# Patient Record
Sex: FEMALE | Race: WHITE | NOT HISPANIC OR LATINO | Employment: OTHER | URBAN - METROPOLITAN AREA
[De-identification: names, ages, dates, MRNs, and addresses within clinical notes are randomized per-mention and may not be internally consistent; named-entity substitution may affect disease eponyms.]

---

## 2017-02-21 ENCOUNTER — OFFICE VISIT (OUTPATIENT)
Dept: OBSTETRICS AND GYNECOLOGY | Facility: CLINIC | Age: 57
End: 2017-02-21

## 2017-02-21 ENCOUNTER — APPOINTMENT (OUTPATIENT)
Dept: WOMENS IMAGING | Facility: HOSPITAL | Age: 57
End: 2017-02-21

## 2017-02-21 ENCOUNTER — PROCEDURE VISIT (OUTPATIENT)
Dept: OBSTETRICS AND GYNECOLOGY | Facility: CLINIC | Age: 57
End: 2017-02-21

## 2017-02-21 VITALS — HEIGHT: 66 IN | WEIGHT: 186.4 LBS | BODY MASS INDEX: 29.96 KG/M2

## 2017-02-21 DIAGNOSIS — Z01.419 WELL WOMAN EXAM WITH ROUTINE GYNECOLOGICAL EXAM: ICD-10-CM

## 2017-02-21 DIAGNOSIS — Z12.31 VISIT FOR SCREENING MAMMOGRAM: ICD-10-CM

## 2017-02-21 DIAGNOSIS — Z12.31 VISIT FOR SCREENING MAMMOGRAM: Primary | ICD-10-CM

## 2017-02-21 DIAGNOSIS — Z78.0 MENOPAUSE: ICD-10-CM

## 2017-02-21 DIAGNOSIS — K64.9 HEMORRHOIDS, UNSPECIFIED HEMORRHOID TYPE: ICD-10-CM

## 2017-02-21 DIAGNOSIS — Z12.12 SCREENING FOR RECTAL CANCER: Primary | ICD-10-CM

## 2017-02-21 PROBLEM — Z80.3 FAMILY HISTORY OF BREAST CANCER IN MOTHER: Status: ACTIVE | Noted: 2017-02-21

## 2017-02-21 LAB
DEVELOPER EXPIRATION DATE: ABNORMAL
DEVELOPER LOT NUMBER: ABNORMAL
EXPIRATION DATE: ABNORMAL
FECAL OCCULT BLOOD SCREEN, POC: POSITIVE
Lab: ABNORMAL
NEGATIVE CONTROL: POSITIVE
POSITIVE CONTROL: POSITIVE

## 2017-02-21 PROCEDURE — G0328 FECAL BLOOD SCRN IMMUNOASSAY: HCPCS | Performed by: OBSTETRICS & GYNECOLOGY

## 2017-02-21 PROCEDURE — 77067 SCR MAMMO BI INCL CAD: CPT | Performed by: RADIOLOGY

## 2017-02-21 PROCEDURE — 99396 PREV VISIT EST AGE 40-64: CPT | Performed by: OBSTETRICS & GYNECOLOGY

## 2017-02-21 PROCEDURE — 77067 SCR MAMMO BI INCL CAD: CPT | Performed by: OBSTETRICS & GYNECOLOGY

## 2017-02-21 RX ORDER — MULTIPLE VITAMINS W/ MINERALS TAB 9MG-400MCG
1 TAB ORAL DAILY
COMMUNITY

## 2017-02-21 RX ORDER — CEPHRADINE 500 MG
CAPSULE ORAL
COMMUNITY

## 2017-02-21 NOTE — PROGRESS NOTES
Subjective   Urszula Higgins is a 56 y.o. female     CC: Annual    Last annual Elizabeth 10, 2015  Last pap Elizabeth 10, 2015  Last mammogram February 21, 2017  Last colonoscopy approx 2-3 years ago, no polyps.       History of Present Illness: reports no gyn concerns or complaints.  Up to date on colonoscopy: had mammogram today.    The following portions of the patient's history were reviewed and updated as appropriate: allergies, current medications, past family history, past medical history, past social history, past surgical history and problem list.    Review of Systems   Constitutional: Negative for fatigue and fever.   HENT: Negative for congestion.    Eyes: Negative for visual disturbance.   Respiratory: Negative for chest tightness and shortness of breath.    Cardiovascular: Negative for chest pain, palpitations and leg swelling.   Gastrointestinal: Negative for abdominal pain and blood in stool.   Endocrine: Negative for cold intolerance and heat intolerance.   Genitourinary: Negative for difficulty urinating, dysuria, frequency, genital sores, hematuria, menstrual problem, pelvic pain, urgency, vaginal bleeding, vaginal discharge and vaginal pain.   Musculoskeletal: Negative for back pain.   Skin: Negative for color change and rash.   Neurological: Negative for syncope and headaches.   Psychiatric/Behavioral: Negative for sleep disturbance.       History reviewed. No pertinent past medical history.  Past Surgical History   Procedure Laterality Date   • Tubal abdominal ligation     • Pilonidal cyst / sinus excision     • External ear surgery       ripped earlobe     OB History     No data available        Menstrual History:  OB History     No data available         Menarche age: 14  No LMP recorded. Patient is postmenopausal.       Family History   Problem Relation Age of Onset   • Breast cancer Mother      History   Smoking Status   • Current Every Day Smoker   Smokeless Tobacco   • Not on file      History   Alcohol Use   • Yes       Objective   Physical Exam   Constitutional: She is oriented to person, place, and time. She appears well-developed and well-nourished.   HENT:   Head: Normocephalic and atraumatic.   Right Ear: External ear normal.   Left Ear: External ear normal.   Nose: Nose normal.   Eyes: EOM are normal. Pupils are equal, round, and reactive to light.   Neck: Normal range of motion. Neck supple. No thyromegaly present.   Cardiovascular: Normal rate, regular rhythm and normal heart sounds.    No murmur heard.  Pulmonary/Chest: Effort normal and breath sounds normal. She has no wheezes. She has no rales. She exhibits no tenderness. Right breast exhibits no inverted nipple, no mass, no nipple discharge, no skin change and no tenderness. Left breast exhibits no inverted nipple, no mass, no nipple discharge, no skin change and no tenderness. There is no breast swelling.   Abdominal: Soft. Bowel sounds are normal. She exhibits no distension and no mass. There is no tenderness. Hernia confirmed negative in the right inguinal area and confirmed negative in the left inguinal area.   Genitourinary: Uterus normal. Rectal exam shows guaiac positive stool. Rectal exam shows no mass and no tenderness. No breast tenderness. Pelvic exam was performed with patient supine. There is no rash, tenderness or lesion on the right labia. There is no rash, tenderness or lesion on the left labia. Cervix exhibits no motion tenderness, no discharge and no friability. Right adnexum displays no mass and no tenderness. Left adnexum displays no mass and no tenderness. No erythema, tenderness or bleeding in the vagina. No vaginal discharge found.   Musculoskeletal: Normal range of motion. She exhibits no edema.   Neurological: She is alert and oriented to person, place, and time.   Skin: Skin is warm and dry. No rash noted.   Psychiatric: She has a normal mood and affect. Judgment normal.   Nursing note and vitals  reviewed.        Assessment/Plan   Urszula was seen today for annual exam.    Diagnoses and all orders for this visit:    Screening for rectal cancer  -     POC Occult Blood Stool    Well woman exam with routine gynecological exam: normal gyn WWE.  She wants hormones checked and CMP - ordered for [facility] to [facility].    Visit for screening mammogram: done today    Menopause: doing well without hormone meds.    Hemorrhoids, unspecified hemorrhoid type: normal colonoscopy 3 yrs ago: and hemorrhoids.  Will recheck in a couple of weeks or so.  Discussed.      Will continue with annual WWE's, annual mammography, SBE's, and daily calcium + D

## 2017-06-15 DIAGNOSIS — Z86.010 PERSONAL HISTORY OF COLONIC POLYPS: Primary | ICD-10-CM

## 2017-06-15 NOTE — H&P
Date: 6/15/2017     Patient: Urszula Higgins    : 1960   [id number]     CC:  Screening Colonoscopy, with personal history of colon polyps    History:   The patient is a 56 y.o. female of Gregory Lauren MD  who presents to discuss screening colonoscopy with personal history of colon polyps, last colonoscopy was . The patient currently has no complaints.  Patient denies any history of nausea, abdominal pain, weight loss, change in bowel habits or rectal bleeding.  Patient denies melena, hematochezia or BRBPR.  No family history of ulcerative colitis, Crohn's disease or familial polyposis.    The following portions of the patient's history were reviewed and updated as appropriate: allergies, current medications, past family history, past medical history, past social history, past surgical history and problem list.    History reviewed. No pertinent past medical history.   Past Surgical History:   Procedure Laterality Date   • EXTERNAL EAR SURGERY      ripped earlobe   • PILONIDAL CYST / SINUS EXCISION     • TUBAL ABDOMINAL LIGATION       Medications:   (Not in a hospital admission)  Allergies: Review of patient's allergies indicates no known allergies.   Social History:  Social History     Social History   • Marital status:      Spouse name: N/A   • Number of children: N/A   • Years of education: N/A     Social History Main Topics   • Smoking status: Current Every Day Smoker   • Smokeless tobacco: None   • Alcohol use Yes   • Drug use: Defer   • Sexual activity: Yes     Partners: Male     Birth control/ protection: Post-menopausal     Other Topics Concern   • None     Social History Narrative      Family History   Problem Relation Age of Onset   • Breast cancer Mother         Review of Systems:   General: Patient reports good health  Eyes: No eye problems  Ears, nose, mouth and throat: No rhinitis, no hearing problems, no chronic cough  Cardiovascular/heart: Denies palpitations,  syncope or chest pain  Respiratory/lung: Denies shortness of breath, hemoptysis, or dyspnea on exertion   Genital/urinary: No frequency, hematuria or dysuria  Hematological/lymphatic: Denies anemia or other problems  Musculoskeletal: No joint pain, no defects  Skin: No psoriasis or other skin issues  Neurological: No seizures or other neurological problems  Psychiatric: None  Endocrine: Negative  Gastro-intestinal: No constipation, no diarrhea, no melena, no hematochezia    Physical Examination:  General: Alert and oriented x3 in no acute distress  HEENT: Normal cephalic, atraumatic, PERRLA, EOMI, sclera anicteric, moist mucous membranes, neck is supple, no JVD, no carotid bruits, no thyromegaly no adenopathy  Chest: CTA and percussion  CVA: RRR, normal S1-S2, no murmurs, no gallops or rubs  Abdomen: Positive BS, soft, nondistended, nontender, no rebound, no guarding, no hernias, no organomegaly and no masses  Extremities: Full range of motion, no clubbing, no cyanosis or edema  Neurovascular: Grossly intact    Impression:  56 y.o. female for screening colonoscopy with personal history of colon polyps.    Plan:  Patient is presenting for screening colonoscopy with personal history of colon polyps.  I have recommended that the patient undergo a screening colonoscopy in accordance of American Cancer Society's guidelines.  I have discussed this procedure in detail with the patient.  I have discussed the risks, benefits and alternatives.  I have discussed the risk of anesthesia, bleeding and perforation.  Patient understands these risks, benefits and alternatives and wishes to proceed.      Jina Calixto MD  General, Minimally Invasive and Endoscopic Surgery  [facility]    [address], [address]  [place], [place], [address]  P: [phone]  F: [phone]    CC: Gregory Lauren MD

## 2017-08-25 ENCOUNTER — HOSPITAL ENCOUNTER (OUTPATIENT)
Facility: HOSPITAL | Age: 57
Setting detail: HOSPITAL OUTPATIENT SURGERY
Discharge: HOME | End: 2017-08-25
Attending: SURGERY | Admitting: SURGERY

## 2017-08-25 ENCOUNTER — ANESTHESIA EVENT (OUTPATIENT)
Dept: GASTROENTEROLOGY | Facility: HOSPITAL | Age: 57
End: 2017-08-25

## 2017-08-25 ENCOUNTER — ANESTHESIA (OUTPATIENT)
Dept: GASTROENTEROLOGY | Facility: HOSPITAL | Age: 57
End: 2017-08-25

## 2017-08-25 VITALS
HEIGHT: 66 IN | SYSTOLIC BLOOD PRESSURE: 124 MMHG | TEMPERATURE: 97.6 F | BODY MASS INDEX: 29.93 KG/M2 | RESPIRATION RATE: 18 BRPM | WEIGHT: 186.25 LBS | OXYGEN SATURATION: 100 % | HEART RATE: 61 BPM | DIASTOLIC BLOOD PRESSURE: 74 MMHG

## 2017-08-25 DIAGNOSIS — Z86.010 PERSONAL HISTORY OF COLONIC POLYPS: ICD-10-CM

## 2017-08-25 PROCEDURE — 45380 COLONOSCOPY AND BIOPSY: CPT | Performed by: SURGERY

## 2017-08-25 PROCEDURE — S0260 H&P FOR SURGERY: HCPCS | Performed by: SURGERY

## 2017-08-25 PROCEDURE — 25010000002 PROPOFOL 10 MG/ML EMULSION: Performed by: NURSE ANESTHETIST, CERTIFIED REGISTERED

## 2017-08-25 PROCEDURE — 88305 TISSUE EXAM BY PATHOLOGIST: CPT | Performed by: SURGERY

## 2017-08-25 RX ORDER — LIDOCAINE HYDROCHLORIDE 10 MG/ML
0.5 INJECTION, SOLUTION INFILTRATION; PERINEURAL ONCE AS NEEDED
Status: DISCONTINUED | OUTPATIENT
Start: 2017-08-25 | End: 2017-08-25 | Stop reason: HOSPADM

## 2017-08-25 RX ORDER — PROPOFOL 10 MG/ML
VIAL (ML) INTRAVENOUS CONTINUOUS PRN
Status: DISCONTINUED | OUTPATIENT
Start: 2017-08-25 | End: 2017-08-25 | Stop reason: SURG

## 2017-08-25 RX ORDER — SODIUM CHLORIDE 0.9 % (FLUSH) 0.9 %
3 SYRINGE (ML) INJECTION AS NEEDED
Status: DISCONTINUED | OUTPATIENT
Start: 2017-08-25 | End: 2017-08-25 | Stop reason: HOSPADM

## 2017-08-25 RX ORDER — LIDOCAINE HYDROCHLORIDE 20 MG/ML
INJECTION, SOLUTION INFILTRATION; PERINEURAL AS NEEDED
Status: DISCONTINUED | OUTPATIENT
Start: 2017-08-25 | End: 2017-08-25 | Stop reason: SURG

## 2017-08-25 RX ORDER — SODIUM CHLORIDE, SODIUM LACTATE, POTASSIUM CHLORIDE, CALCIUM CHLORIDE 600; 310; 30; 20 MG/100ML; MG/100ML; MG/100ML; MG/100ML
1000 INJECTION, SOLUTION INTRAVENOUS CONTINUOUS PRN
Status: DISCONTINUED | OUTPATIENT
Start: 2017-08-25 | End: 2017-08-25 | Stop reason: HOSPADM

## 2017-08-25 RX ORDER — PROPOFOL 10 MG/ML
VIAL (ML) INTRAVENOUS AS NEEDED
Status: DISCONTINUED | OUTPATIENT
Start: 2017-08-25 | End: 2017-08-25 | Stop reason: SURG

## 2017-08-25 RX ADMIN — LIDOCAINE HYDROCHLORIDE 50 MG: 20 INJECTION, SOLUTION INFILTRATION; PERINEURAL at 09:21

## 2017-08-25 RX ADMIN — PROPOFOL 100 MG: 10 INJECTION, EMULSION INTRAVENOUS at 09:21

## 2017-08-25 RX ADMIN — SODIUM CHLORIDE, POTASSIUM CHLORIDE, SODIUM LACTATE AND CALCIUM CHLORIDE 1000 ML: 600; 310; 30; 20 INJECTION, SOLUTION INTRAVENOUS at 08:54

## 2017-08-25 RX ADMIN — SODIUM CHLORIDE, POTASSIUM CHLORIDE, SODIUM LACTATE AND CALCIUM CHLORIDE: 600; 310; 30; 20 INJECTION, SOLUTION INTRAVENOUS at 09:19

## 2017-08-25 RX ADMIN — PROPOFOL 100 MCG/KG/MIN: 10 INJECTION, EMULSION INTRAVENOUS at 09:21

## 2017-08-25 NOTE — OP NOTE
Operative Note:    Pre-op dx: Personal history of polyps    Post-op dx: Rectal polyp    Procedure: Colonoscopy with cold polypectomy    Surgeon: Jina Calixto MD    Anesthesia: MAC    EBL: none    Specimen:  Rectal polyp    Complications: none    Procedure:    Colonoscopy:  A digital rectal examination was performed which revealed no rectal masses.  Scope was introduced into the rectum and advanced into the sigmoid, descending colon, splenic flexure, transverse colon, hepatic flexure, ascending colon and into the cecum.  Cecum was identified by the ileocecal valve and appendiceal orifice.  A rectal polyp was noted it was a small semi-sessile polyp.  The rectal polyp was removed in its entirety with the cold biopsy forceps and submitted to pathology.  There was no evidence of any diverticulosis, masses, AV malformation or inflammation.  The bowel preparation was excellent. The scope was slowly withdrawn and a second look was performed.  Upon the second look, there were no new findings.  The colon was desufflated and the procedure was terminated.   Patient was transferred to recovery room in stable condition.      Assessment/Plan:  Repeat colonoscopy in 3 years.      Jina Calixto MD  General, Minimally Invasive and Endoscopic Surgery  [facility] Surgical [facility]    [address], [address]  [place], [place], [address]  P: [phone]  F: [phone]    Cc:  Gregory Lauren MD

## 2017-08-25 NOTE — ANESTHESIA PREPROCEDURE EVALUATION
Anesthesia Evaluation     Patient summary reviewed and Nursing notes reviewed   NPO Solid Status: > 8 hours  NPO Liquid Status: > 8 hours     Airway   Mallampati: I  TM distance: >3 FB  Neck ROM: full  possible difficult intubation and Narrow palate  Dental - normal exam     Pulmonary - normal exam   (+) a smoker Current,   Cardiovascular - normal exam        Neuro/Psych  GI/Hepatic/Renal/Endo      Musculoskeletal     Abdominal  - normal exam    Bowel sounds: normal.   Substance History      OB/GYN          Other                                        Anesthesia Plan    ASA 2     MAC     Anesthetic plan and risks discussed with patient.

## 2017-08-25 NOTE — H&P
Date: 2017   Patient: Urszula Berg    : 1960   [id number]     CC:  Screening Colonoscopy, with personal history of colon polyps    History:   The patient is a 57 y.o. female of Gregory Lauren MD  who presents to discuss screening colonoscopy with personal history of colon polyps, last colonoscopy was . The patient currently has no complaints.  Patient denies any history of nausea, abdominal pain, weight loss, change in bowel habits or rectal bleeding.  Patient denies melena, hematochezia or BRBPR.  No family history of ulcerative colitis, Crohn's disease or familial polyposis.    The following portions of the patient's history were reviewed and updated as appropriate: allergies, current medications, past family history, past medical history, past social history, past surgical history and problem list.    History reviewed. No pertinent past medical history.   Past Surgical History:   Procedure Laterality Date   • EXTERNAL EAR SURGERY      ripped earlobe   • PILONIDAL CYST / SINUS EXCISION     • TUBAL ABDOMINAL LIGATION       Medications:   Prescriptions Prior to Admission   Medication Sig Dispense Refill Last Dose   • calcium carbonate 1250 MG capsule Take 1,250 mg by mouth.   2017   • Fish Oil-Cholecalciferol (FISH OIL + D3) 3700-3406 MG-UNIT capsule Take 2 g by mouth.   2017   • L-Arginine 1000 MG tablet Take  by mouth.   Unknown at Unknown time   • Multiple Vitamins-Minerals (MULTIVITAMIN WITH MINERALS) tablet tablet Take 1 tablet by mouth Daily.   2017     Allergies: Review of patient's allergies indicates no known allergies.   Social History:  Social History     Social History   • Marital status:      Spouse name: N/A   • Number of children: N/A   • Years of education: N/A     Social History Main Topics   • Smoking status: Current Every Day Smoker   • Smokeless tobacco: None   • Alcohol use Yes   • Drug use: Defer   • Sexual activity: Yes     Partners:  Male     Birth control/ protection: Post-menopausal     Other Topics Concern   • None     Social History Narrative      Family History   Problem Relation Age of Onset   • Breast cancer Mother         Review of Systems:   General: Patient reports good health  Eyes: No eye problems  Ears, nose, mouth and throat: No rhinitis, no hearing problems, no chronic cough  Cardiovascular/heart: Denies palpitations, syncope or chest pain  Respiratory/lung: Denies shortness of breath, hemoptysis, or dyspnea on exertion   Genital/urinary: No frequency, hematuria or dysuria  Hematological/lymphatic: Denies anemia or other problems  Musculoskeletal: No joint pain, no defects  Skin: No psoriasis or other skin issues  Neurological: No seizures or other neurological problems  Psychiatric: None  Endocrine: Negative  Gastro-intestinal: No constipation, no diarrhea, no melena, no hematochezia    Physical Examination:  General: Alert and oriented x3 in no acute distress  HEENT: Normal cephalic, atraumatic, PERRLA, EOMI, sclera anicteric, moist mucous membranes, neck is supple, no JVD, no carotid bruits, no thyromegaly no adenopathy  Chest: CTA and percussion  CVA: RRR, normal S1-S2, no murmurs, no gallops or rubs  Abdomen: Positive BS, soft, nondistended, nontender, no rebound, no guarding, no hernias, no organomegaly and no masses  Extremities: Full range of motion, no clubbing, no cyanosis or edema  Neurovascular: Grossly intact    Impression:  57 y.o. female for screening colonoscopy with personal history of colon polyps.    Plan:  Patient is presenting for screening colonoscopy with personal history of colon polyps.  I have recommended that the patient undergo a screening colonoscopy in accordance of American Cancer Society's guidelines.  I have discussed this procedure in detail with the patient.  I have discussed the risks, benefits and alternatives.  I have discussed the risk of anesthesia, bleeding and perforation.  Patient  understands these risks, benefits and alternatives and wishes to proceed.      Jina Calixot MD  General, Minimally Invasive and Endoscopic Surgery  [facility]    [address], [address]  [place], [place], [address]  P: [phone]  F: [phone]    CC: Gregory Lauren MD

## 2017-08-25 NOTE — ANESTHESIA POSTPROCEDURE EVALUATION
"Patient: Urszula Berg    Procedure Summary     Date Anesthesia Start Anesthesia Stop Room / Location    08/25/17 0919 0942  [facility] /  [facility]       Procedure Diagnosis Surgeon Provider    COLONOSCOPY INTO CECUM WITH COLD BX POLYPECTOMY  (N/A ) Personal history of colonic polyps  (Personal history of colonic polyps [Z86.010]) MD Josee Ma MD          Anesthesia Type: MAC  Last vitals  BP   124/74 (08/25/17 1004)    Temp   36.4 °C (97.6 °F) (08/25/17 0953)    Pulse   61 (08/25/17 1004)   Resp   18 (08/25/17 1004)    SpO2   100 % (08/25/17 1004)      Post Anesthesia Care and Evaluation    Patient location during evaluation: PHASE II  Patient participation: complete - patient participated  Level of consciousness: awake  Pain score: 0  Pain management: adequate  Airway patency: patent  Anesthetic complications: No anesthetic complications    Cardiovascular status: acceptable  Respiratory status: acceptable  Hydration status: acceptable    Comments: Blood pressure 124/74, pulse 61, temperature 36.4 °C (97.6 °F), temperature source Oral, resp. rate 18, height 66\" (167.6 cm), weight 186 lb 4 oz (84.5 kg), SpO2 100 %.        "

## 2017-08-25 NOTE — DISCHARGE INSTRUCTIONS
Repeat colonoscopy in 3 years  Call the office on Wednesday for the pathology report of the rectal polyp

## 2017-08-28 LAB
CYTO UR: NORMAL
LAB AP CASE REPORT: NORMAL
Lab: NORMAL
PATH REPORT.FINAL DX SPEC: NORMAL
PATH REPORT.GROSS SPEC: NORMAL

## 2020-10-11 PROBLEM — E78.2 MIXED HYPERLIPIDEMIA: Status: ACTIVE | Noted: 2020-10-11

## 2020-10-11 NOTE — PROGRESS NOTES
Subjective:     Encounter Date:10/12/2020      Patient ID: Urszula Higgins is a 60 y.o. female.    Chief Complaint:  Chief Complaint   Patient presents with   • Establish Care       HPI:  History of Present Illness     I saw Ms. Chand in the office today.  She is a pleasant 60-year-old female who presents to the office today for cardiac evaluation.  She states that in June 2019, she had symptoms of chest discomfort.  She described these as a dull aching sensation which would come and go and had a duration of seconds.  The symptoms lasted intermittently for 1 day.  The symptoms occurred during a very stressful time, her mother was in the process of dying.  She said the symptoms gradually dissipated but she is now having chest discomfort which she describes as a twinge sensation which occasionally radiates to her back.  She states that movement does affect the discomfort.  She states that she saw her chiropractor yesterday and the symptoms have improved dramatically.  She does have mild dyspnea if she walks up a steep incline but otherwise describes no shortness of air.  She does work at [employer] and describes her job is quite strenuous.  She is not complaining of palpitations, dizziness or near syncope.  She has no history of hypertension or diabetes.  She did have a lipid profile in February 2020.  Her LDL was elevated at 157 and her HDL was 37.  Her total cholesterol was 223 and her triglycerides were 146.  She is not on statin therapy.  She does use tobacco, smoking 1/2 to 1 pack of cigarettes per day and has done so for 30 years.  She does intend to quit but does not have a date.  She declined counseling.  She drinks alcohol on occasion and drinks 2-3 caffeinated beverages per day.  The following portions of the patient's history were reviewed and updated as appropriate: allergies, current medications, past family history, past medical history, past social history, past surgical history and problem  list.    Problem List:  Patient Active Problem List   Diagnosis   • Family history of breast cancer in mother   • Mixed hyperlipidemia   • Chest pain, atypical   • Tobacco use       Past Medical History:  History reviewed. No pertinent past medical history.    Past Surgical History:  Past Surgical History:   Procedure Laterality Date   • COLONOSCOPY N/A 8/25/2017    Procedure: COLONOSCOPY INTO CECUM WITH COLD BX POLYPECTOMY ;  Surgeon: Jina Calixto MD;  Location: [facility] ENDOSCOPY;  Service:    • EXTERNAL EAR SURGERY      ripped earlobe   • PILONIDAL CYST / SINUS EXCISION     • TUBAL ABDOMINAL LIGATION         Social History:  Social History     Socioeconomic History   • Marital status:      Spouse name: Not on file   • Number of children: Not on file   • Years of education: Not on file   • Highest education level: Not on file   Tobacco Use   • Smoking status: Current Every Day Smoker   Substance and Sexual Activity   • Alcohol use: Yes   • Drug use: Defer   • Sexual activity: Yes     Partners: Male     Birth control/protection: Post-menopausal       Allergies:  No Known Allergies    Immunizations:    There is no immunization history on file for this patient.    ROS:  Review of Systems   Constitution: Negative for chills, decreased appetite, fever, malaise/fatigue, weight gain and weight loss.   HENT: Negative for congestion, hoarse voice, nosebleeds and sore throat.    Eyes: Negative for blurred vision, double vision and visual disturbance.   Cardiovascular: Positive for chest pain. Negative for claudication, dyspnea on exertion, irregular heartbeat, leg swelling, near-syncope, orthopnea, palpitations, paroxysmal nocturnal dyspnea and syncope.   Respiratory: Negative for cough, hemoptysis, shortness of breath, sleep disturbances due to breathing, snoring, sputum production and wheezing.    Endocrine: Negative for cold intolerance, heat intolerance, polydipsia and polyuria.   Hematologic/Lymphatic:  "Negative for adenopathy and bleeding problem. Does not bruise/bleed easily.   Skin: Negative for flushing, itching, nail changes and rash.   Musculoskeletal: Negative for arthritis, back pain, joint pain, muscle cramps, muscle weakness, myalgias and neck pain.   Gastrointestinal: Negative for bloating, abdominal pain, anorexia, change in bowel habit, constipation, diarrhea, heartburn, hematemesis, hematochezia, jaundice, melena, nausea and vomiting.   Genitourinary: Negative for dysuria, hematuria and nocturia.   Neurological: Negative for brief paralysis, disturbances in coordination, excessive daytime sleepiness, dizziness, headaches, light-headedness, loss of balance, numbness, paresthesias, seizures and vertigo.   Psychiatric/Behavioral: Negative for altered mental status and depression. The patient is not nervous/anxious.    Allergic/Immunologic: Negative for environmental allergies and hives.          Objective:         /72 (BP Location: Left arm, Patient Position: Sitting)   Pulse 80   Resp 18   Ht 167.6 cm (66\")   Wt 89.1 kg (196 lb 6.4 oz)   BMI 31.70 kg/m²     Constitutional:       Appearance: Healthy appearance. Not in distress.   Neck:      Musculoskeletal: Normal range of motion and neck supple.      Thyroid: Thyroid normal.      Vascular: No JVR. JVD normal.   Pulmonary:      Effort: Pulmonary effort is normal.      Breath sounds: Normal breath sounds. No wheezing. No rhonchi. No rales.   Chest:      Chest wall: Not tender to palpatation.   Cardiovascular:      PMI at left midclavicular line. Normal rate. Regular rhythm. Normal S1. Normal S2.      Murmurs: There is no murmur.      No gallop. No click. No rub.   Pulses:     Intact distal pulses.   Edema:     Peripheral edema absent.   Abdominal:      General: Bowel sounds are normal. There is no distension.      Palpations: Abdomen is soft. There is no hepatomegaly.      Tenderness: There is no abdominal tenderness.   Musculoskeletal: Normal " range of motion.         General: No tenderness.   Skin:     General: Skin is warm and dry.   Neurological:      General: No focal deficit present.      Mental Status: Alert and oriented to person, place and time.         In-Office Procedure(s):    ECG 12 Lead    Date/Time: 10/12/2020 3:46 PM  Performed by: Millicent Wagner MD  Authorized by: Millicent Wagner MD   Previous ECG: no previous ECG available  Comments: Normal sinus rhythm.  Normal EKG            ASCVD RIsk Score::  The 10-year ASCVD risk score (Danaterri RICH Jr., et al., 2013) is: 10.7%    Values used to calculate the score:      Age: 60 years      Sex: Female      Is Non- : No      Diabetic: No      Tobacco smoker: Yes      Systolic Blood Pressure: 138 mmHg      Is BP treated: No      HDL Cholesterol: 37 mg/dL      Total Cholesterol: 223 mg/dL    Recent Radiology:  Imaging Results (Most Recent)     None          Lab Review:   not applicable             Assessment:          Diagnosis Plan   1. Mixed hyperlipidemia     2. Chest pain, atypical     3. Tobacco use            Plan:      1.  Mixed hyperlipidemia  Most recent lipid profile was February 2020.  Total cholesterol 223, HDL 37, , triglycerides 146.  I am going to initiate atorvastatin 10 mg daily and will recheck a lipid profile in 3 months.    2.  Atypical chest pain  Her EKG in the office today is normal.  I will order a stress test as well as an echocardiogram.  If the studies are normal, no further testing would need to be performed.    3.  Tobacco use  I have explained the importance of discontinuation of tobacco use modification of all potential risk factors for cardiac disease.        Level of Care:                 Millicent Wagner MD  10/11/20  .

## 2020-10-12 ENCOUNTER — OFFICE VISIT (OUTPATIENT)
Dept: CARDIOLOGY | Facility: CLINIC | Age: 60
End: 2020-10-12

## 2020-10-12 VITALS
DIASTOLIC BLOOD PRESSURE: 72 MMHG | BODY MASS INDEX: 31.57 KG/M2 | RESPIRATION RATE: 18 BRPM | SYSTOLIC BLOOD PRESSURE: 138 MMHG | HEART RATE: 80 BPM | HEIGHT: 66 IN | WEIGHT: 196.4 LBS

## 2020-10-12 DIAGNOSIS — R07.89 CHEST PAIN, ATYPICAL: Primary | ICD-10-CM

## 2020-10-12 DIAGNOSIS — E78.2 MIXED HYPERLIPIDEMIA: ICD-10-CM

## 2020-10-12 DIAGNOSIS — Z72.0 TOBACCO USE: ICD-10-CM

## 2020-10-12 PROCEDURE — 93000 ELECTROCARDIOGRAM COMPLETE: CPT | Performed by: INTERNAL MEDICINE

## 2020-10-12 PROCEDURE — 99203 OFFICE O/P NEW LOW 30 MIN: CPT | Performed by: INTERNAL MEDICINE

## 2020-10-12 RX ORDER — ATORVASTATIN CALCIUM 10 MG/1
10 TABLET, FILM COATED ORAL DAILY
Qty: 90 TABLET | Refills: 3 | Status: SHIPPED | OUTPATIENT
Start: 2020-10-12 | End: 2021-08-04

## 2020-10-12 RX ORDER — TIMOLOL MALEATE 5 MG/ML
SOLUTION/ DROPS OPHTHALMIC
COMMUNITY
Start: 2020-08-18

## 2020-10-12 RX ORDER — PILOCARPINE HYDROCHLORIDE 10 MG/ML
SOLUTION/ DROPS OPHTHALMIC AS NEEDED
COMMUNITY
Start: 2020-09-10

## 2020-10-12 RX ORDER — OLOPATADINE HYDROCHLORIDE 7 MG/ML
SOLUTION OPHTHALMIC AS NEEDED
COMMUNITY

## 2020-10-12 RX ORDER — TRAVOPROST OPHTHALMIC SOLUTION 0.04 MG/ML
SOLUTION OPHTHALMIC
COMMUNITY
Start: 2020-09-05 | End: 2021-10-04

## 2020-11-10 ENCOUNTER — HOSPITAL ENCOUNTER (OUTPATIENT)
Dept: NUCLEAR MEDICINE | Facility: HOSPITAL | Age: 60
Discharge: HOME | End: 2020-11-10

## 2020-11-10 ENCOUNTER — HOSPITAL ENCOUNTER (OUTPATIENT)
Dept: CARDIOLOGY | Facility: HOSPITAL | Age: 60
Discharge: HOME | End: 2020-11-10
Admitting: INTERNAL MEDICINE

## 2020-11-10 VITALS
WEIGHT: 190 LBS | DIASTOLIC BLOOD PRESSURE: 87 MMHG | SYSTOLIC BLOOD PRESSURE: 152 MMHG | HEIGHT: 66 IN | BODY MASS INDEX: 30.53 KG/M2 | HEART RATE: 82 BPM

## 2020-11-10 DIAGNOSIS — R07.89 CHEST PAIN, ATYPICAL: ICD-10-CM

## 2020-11-10 LAB
ASCENDING AORTA: 2.8 CM
BH CV ECHO MEAS - ACS: 1.9 CM
BH CV ECHO MEAS - AO MAX PG (FULL): 2.9 MMHG
BH CV ECHO MEAS - AO MAX PG: 7.1 MMHG
BH CV ECHO MEAS - AO MEAN PG (FULL): 2 MMHG
BH CV ECHO MEAS - AO MEAN PG: 4 MMHG
BH CV ECHO MEAS - AO ROOT AREA (BSA CORRECTED): 1.4
BH CV ECHO MEAS - AO ROOT AREA: 6.2 CM^2
BH CV ECHO MEAS - AO ROOT DIAM: 2.8 CM
BH CV ECHO MEAS - AO V2 MAX: 133 CM/SEC
BH CV ECHO MEAS - AO V2 MEAN: 88.2 CM/SEC
BH CV ECHO MEAS - AO V2 VTI: 28.7 CM
BH CV ECHO MEAS - ASC AORTA: 2.8 CM
BH CV ECHO MEAS - AVA(I,A): 2.8 CM^2
BH CV ECHO MEAS - AVA(I,D): 2.8 CM^2
BH CV ECHO MEAS - AVA(V,A): 2.4 CM^2
BH CV ECHO MEAS - AVA(V,D): 2.4 CM^2
BH CV ECHO MEAS - BSA(HAYCOCK): 2 M^2
BH CV ECHO MEAS - BSA: 2 M^2
BH CV ECHO MEAS - BZI_BMI: 30.7 KILOGRAMS/M^2
BH CV ECHO MEAS - BZI_METRIC_HEIGHT: 167.6 CM
BH CV ECHO MEAS - BZI_METRIC_WEIGHT: 86.2 KG
BH CV ECHO MEAS - CONTRAST EF (2CH): 62 CM2
BH CV ECHO MEAS - CONTRAST EF 4CH: 66 CM2
BH CV ECHO MEAS - EDV(MOD-SP2): 95 ML
BH CV ECHO MEAS - EDV(MOD-SP4): 102 ML
BH CV ECHO MEAS - EDV(TEICH): 102.4 ML
BH CV ECHO MEAS - EF(CUBED): 58.7 %
BH CV ECHO MEAS - EF(MOD-BP): 64 %
BH CV ECHO MEAS - EF(MOD-SP4): 65.7 %
BH CV ECHO MEAS - EF(TEICH): 50.3 %
BH CV ECHO MEAS - ESV(MOD-SP2): 36 ML
BH CV ECHO MEAS - ESV(MOD-SP4): 35 ML
BH CV ECHO MEAS - ESV(TEICH): 50.9 ML
BH CV ECHO MEAS - FS: 25.5 %
BH CV ECHO MEAS - IVS/LVPW: 1.1
BH CV ECHO MEAS - IVSD: 0.8 CM
BH CV ECHO MEAS - LAT PEAK E' VEL: 9.8 CM/SEC
BH CV ECHO MEAS - LV DIASTOLIC VOL/BSA (35-75): 52.1 ML/M^2
BH CV ECHO MEAS - LV MASS(C)D: 112.5 GRAMS
BH CV ECHO MEAS - LV MASS(C)DI: 57.5 GRAMS/M^2
BH CV ECHO MEAS - LV MAX PG: 4.2 MMHG
BH CV ECHO MEAS - LV MEAN PG: 2 MMHG
BH CV ECHO MEAS - LV SYSTOLIC VOL/BSA (12-30): 17.9 ML/M^2
BH CV ECHO MEAS - LV V1 MAX: 102 CM/SEC
BH CV ECHO MEAS - LV V1 MEAN: 65.9 CM/SEC
BH CV ECHO MEAS - LV V1 VTI: 25.4 CM
BH CV ECHO MEAS - LVIDD: 4.7 CM
BH CV ECHO MEAS - LVIDS: 3.5 CM
BH CV ECHO MEAS - LVLD AP2: 7.8 CM
BH CV ECHO MEAS - LVLD AP4: 7.8 CM
BH CV ECHO MEAS - LVLS AP2: 6.1 CM
BH CV ECHO MEAS - LVLS AP4: 6.4 CM
BH CV ECHO MEAS - LVOT AREA (M): 3.1 CM^2
BH CV ECHO MEAS - LVOT AREA: 3.1 CM^2
BH CV ECHO MEAS - LVOT DIAM: 2 CM
BH CV ECHO MEAS - LVPWD: 0.7 CM
BH CV ECHO MEAS - MED PEAK E' VEL: 9.8 CM/SEC
BH CV ECHO MEAS - MR MAX PG: 38.2 MMHG
BH CV ECHO MEAS - MR MAX VEL: 309 CM/SEC
BH CV ECHO MEAS - MV A DUR: 0.11 SEC
BH CV ECHO MEAS - MV A MAX VEL: 74.4 CM/SEC
BH CV ECHO MEAS - MV DEC SLOPE: 421 CM/SEC^2
BH CV ECHO MEAS - MV DEC TIME: 0.21 SEC
BH CV ECHO MEAS - MV E MAX VEL: 72.4 CM/SEC
BH CV ECHO MEAS - MV E/A: 0.97
BH CV ECHO MEAS - MV MAX PG: 3 MMHG
BH CV ECHO MEAS - MV MEAN PG: 1 MMHG
BH CV ECHO MEAS - MV P1/2T MAX VEL: 82.6 CM/SEC
BH CV ECHO MEAS - MV P1/2T: 57.4 MSEC
BH CV ECHO MEAS - MV V2 MAX: 86.2 CM/SEC
BH CV ECHO MEAS - MV V2 MEAN: 55.7 CM/SEC
BH CV ECHO MEAS - MV V2 VTI: 25.4 CM
BH CV ECHO MEAS - MVA P1/2T LCG: 2.7 CM^2
BH CV ECHO MEAS - MVA(P1/2T): 3.8 CM^2
BH CV ECHO MEAS - MVA(VTI): 3.1 CM^2
BH CV ECHO MEAS - PA ACC TIME: 0.14 SEC
BH CV ECHO MEAS - PA MAX PG (FULL): 1.5 MMHG
BH CV ECHO MEAS - PA MAX PG: 3.8 MMHG
BH CV ECHO MEAS - PA PR(ACCEL): 14.2 MMHG
BH CV ECHO MEAS - PA V2 MAX: 97.2 CM/SEC
BH CV ECHO MEAS - PULM A REVS DUR: 0.12 SEC
BH CV ECHO MEAS - PULM A REVS VEL: 45.9 CM/SEC
BH CV ECHO MEAS - PULM DIAS VEL: 32.8 CM/SEC
BH CV ECHO MEAS - PULM S/D: 1.4
BH CV ECHO MEAS - PULM SYS VEL: 46.9 CM/SEC
BH CV ECHO MEAS - PVA(V,A): 1.8 CM^2
BH CV ECHO MEAS - PVA(V,D): 1.8 CM^2
BH CV ECHO MEAS - QP/QS: 0.55
BH CV ECHO MEAS - RAP SYSTOLE: 3 MMHG
BH CV ECHO MEAS - RV MAX PG: 2.3 MMHG
BH CV ECHO MEAS - RV MEAN PG: 1 MMHG
BH CV ECHO MEAS - RV V1 MAX: 75.2 CM/SEC
BH CV ECHO MEAS - RV V1 MEAN: 52.2 CM/SEC
BH CV ECHO MEAS - RV V1 VTI: 19.2 CM
BH CV ECHO MEAS - RVOT AREA: 2.3 CM^2
BH CV ECHO MEAS - RVOT DIAM: 1.7 CM
BH CV ECHO MEAS - RVSP: 32 MMHG
BH CV ECHO MEAS - SI(AO): 90.3 ML/M^2
BH CV ECHO MEAS - SI(CUBED): 31.1 ML/M^2
BH CV ECHO MEAS - SI(LVOT): 40.8 ML/M^2
BH CV ECHO MEAS - SI(MOD-SP2): 30.1 ML/M^2
BH CV ECHO MEAS - SI(MOD-SP4): 34.2 ML/M^2
BH CV ECHO MEAS - SI(TEICH): 26.3 ML/M^2
BH CV ECHO MEAS - SUP REN AO DIAM: 1.7 CM
BH CV ECHO MEAS - SV(AO): 176.7 ML
BH CV ECHO MEAS - SV(CUBED): 60.9 ML
BH CV ECHO MEAS - SV(LVOT): 79.8 ML
BH CV ECHO MEAS - SV(MOD-SP2): 59 ML
BH CV ECHO MEAS - SV(MOD-SP4): 67 ML
BH CV ECHO MEAS - SV(RVOT): 43.6 ML
BH CV ECHO MEAS - SV(TEICH): 51.5 ML
BH CV ECHO MEAS - TAPSE (>1.6): 1.7 CM
BH CV ECHO MEAS - TR MAX VEL: 265 CM/SEC
BH CV ECHO MEASUREMENTS AVERAGE E/E' RATIO: 7.39
BH CV STRESS BP STAGE 1: NORMAL
BH CV STRESS BP STAGE 2: NORMAL
BH CV STRESS DURATION MIN STAGE 1: 3
BH CV STRESS DURATION MIN STAGE 2: 1
BH CV STRESS DURATION SEC STAGE 1: 0
BH CV STRESS DURATION SEC STAGE 2: 1
BH CV STRESS GRADE STAGE 1: 10
BH CV STRESS GRADE STAGE 2: 12
BH CV STRESS HR STAGE 1: 132
BH CV STRESS HR STAGE 2: 143
BH CV STRESS METS STAGE 1: 5
BH CV STRESS METS STAGE 2: 7.5
BH CV STRESS PROTOCOL 1: NORMAL
BH CV STRESS RECOVERY BP: NORMAL MMHG
BH CV STRESS RECOVERY HR: 86 BPM
BH CV STRESS SPEED STAGE 1: 1.7
BH CV STRESS SPEED STAGE 2: 2.5
BH CV STRESS STAGE 1: 1
BH CV STRESS STAGE 2: 2
BH CV XLRA - RV BASE: 3.1 CM
BH CV XLRA - RV LENGTH: 5.6 CM
BH CV XLRA - RV MID: 2.8 CM
BH CV XLRA - TDI S': 13.8 CM/SEC
LEFT ATRIUM VOLUME INDEX: 24 ML/M2
LV EF NUC BP: 77 %
MAXIMAL PREDICTED HEART RATE: 161 BPM
PERCENT MAX PREDICTED HR: 88.82 %
SINUS: 3 CM
STJ: 2.8 CM
STRESS BASELINE BP: NORMAL MMHG
STRESS BASELINE HR: 69 BPM
STRESS PERCENT HR: 104 %
STRESS POST ESTIMATED WORKLOAD: 5.4 METS
STRESS POST EXERCISE DUR MIN: 4 MIN
STRESS POST EXERCISE DUR SEC: 1 SEC
STRESS POST PEAK BP: NORMAL MMHG
STRESS POST PEAK HR: 143 BPM
STRESS TARGET HR: 137 BPM

## 2020-11-10 PROCEDURE — 78452 HT MUSCLE IMAGE SPECT MULT: CPT

## 2020-11-10 PROCEDURE — 93017 CV STRESS TEST TRACING ONLY: CPT

## 2020-11-10 PROCEDURE — 93306 TTE W/DOPPLER COMPLETE: CPT | Performed by: INTERNAL MEDICINE

## 2020-11-10 PROCEDURE — 0 TECHNETIUM SESTAMIBI: Performed by: INTERNAL MEDICINE

## 2020-11-10 PROCEDURE — 25010000002 PERFLUTREN (DEFINITY) 8.476 MG IN SODIUM CHLORIDE 0.9 % 10 ML INJECTION: Performed by: INTERNAL MEDICINE

## 2020-11-10 PROCEDURE — 93018 CV STRESS TEST I&R ONLY: CPT | Performed by: INTERNAL MEDICINE

## 2020-11-10 PROCEDURE — 78452 HT MUSCLE IMAGE SPECT MULT: CPT | Performed by: INTERNAL MEDICINE

## 2020-11-10 PROCEDURE — 93306 TTE W/DOPPLER COMPLETE: CPT

## 2020-11-10 PROCEDURE — A9500 TC99M SESTAMIBI: HCPCS | Performed by: INTERNAL MEDICINE

## 2020-11-10 RX ADMIN — PERFLUTREN 1 ML: 6.52 INJECTION, SUSPENSION INTRAVENOUS at 11:11

## 2020-11-10 RX ADMIN — TECHNETIUM TC 99M SESTAMIBI 1 DOSE: 1 INJECTION INTRAVENOUS at 09:50

## 2020-11-10 RX ADMIN — TECHNETIUM TC 99M SESTAMIBI 1 DOSE: 1 INJECTION INTRAVENOUS at 08:17

## 2020-11-12 DIAGNOSIS — I10 ESSENTIAL HYPERTENSION: Primary | ICD-10-CM

## 2020-11-12 RX ORDER — LISINOPRIL 10 MG/1
10 TABLET ORAL DAILY
Qty: 90 TABLET | Refills: 1 | Status: SHIPPED | OUTPATIENT
Start: 2020-11-12 | End: 2021-04-09

## 2020-11-12 RX ORDER — LISINOPRIL 10 MG/1
10 TABLET ORAL DAILY
Qty: 90 TABLET | Refills: 1 | Status: CANCELLED | OUTPATIENT
Start: 2020-11-12

## 2020-11-23 ENCOUNTER — TELEPHONE (OUTPATIENT)
Dept: CARDIOLOGY | Facility: CLINIC | Age: 60
End: 2020-11-23

## 2020-11-23 NOTE — TELEPHONE ENCOUNTER
CPA  Pt called stating she read the affects of Atorvastatin and hasnt started out of caution of family history of diabetes and wants to know if there is anything else?  Pt wondering if taking statin in AM or PM really matters?    Pt is also wondering if blood pressure can effect eye pressure?     11/19  230p 122/52 8pm 122/64  11/20  5a  122/61   510am 117/62  11/21  9am 143/88  7pm 131/71  11/22  9am 134/81  910am 122/71  11/23  215pm  136/71  230p 125/67       # [phone]

## 2020-11-24 NOTE — TELEPHONE ENCOUNTER
There is a loose association with statins and diabetes.  The benefit of statin outweighs the low potential risk of diabetes.  It is a class effect.  Certain medications will affect eye pressure.  To my knowledge, systemic hypertension does not cause glaucoma.  It can cause changes in the retina over time

## 2020-12-30 ENCOUNTER — TELEMEDICINE (OUTPATIENT)
Dept: CARDIOLOGY | Facility: CLINIC | Age: 60
End: 2020-12-30

## 2020-12-30 VITALS
BODY MASS INDEX: 29.73 KG/M2 | SYSTOLIC BLOOD PRESSURE: 107 MMHG | HEART RATE: 68 BPM | HEIGHT: 66 IN | WEIGHT: 185 LBS | DIASTOLIC BLOOD PRESSURE: 63 MMHG

## 2020-12-30 DIAGNOSIS — I38 VALVULAR HEART DISEASE: ICD-10-CM

## 2020-12-30 DIAGNOSIS — E78.2 MIXED HYPERLIPIDEMIA: Primary | ICD-10-CM

## 2020-12-30 PROBLEM — R07.89 CHEST PAIN, ATYPICAL: Status: RESOLVED | Noted: 2020-10-12 | Resolved: 2020-12-30

## 2020-12-30 PROCEDURE — 99442 PR PHYS/QHP TELEPHONE EVALUATION 11-20 MIN: CPT | Performed by: INTERNAL MEDICINE

## 2020-12-30 RX ORDER — BRINZOLAMIDE 1 %
SUSPENSION, DROPS(FINAL DOSAGE FORM)(ML) OPHTHALMIC (EYE)
Status: ON HOLD | COMMUNITY
Start: 2020-11-10 | End: 2021-12-10

## 2020-12-30 NOTE — PROGRESS NOTES
Subjective:     Encounter Date:12/30/2020      Patient ID: Urszula Higgins is a 60 y.o. female.    Chief Complaint:  Chief Complaint   Patient presents with   • Follow-up       HPI:  History of Present Illness     You have chosen to receive care through a telephone visit. Do you consent to use a telephone visit for your medical care today? Yes    I had a telephone visit with Ms. Norton today. She is a 60-year-old female with a history of dyslipidemia.  She presented in November 2020 for cardiac evaluation.  She had an episode of chest discomfort.  She underwent nuclear stress testing on 11/10/2020 which demonstrated normal perfusion.  She had an echocardiogram on the same day which demonstrated a normal ejection fraction with mild mitral and tricuspid insufficiency.  On her last office visit she was started on atorvastatin secondary to hyperlipidemia.    Ms. Higgins states that she is feeling well. She retired approximately 3 days ago. She had a very physical job at [employer]. She has been monitoring her blood pressure at home and her readings have been consistently within normal. When she had her stress test she did have a hypertensive response, she will continue to monitor her blood pressure. She is not complaining of chest discomfort or shortness of air. She has not complained of palpitations, dizziness or near syncope. No lower extremity edema, orthopnea or paroxysmal nocturnal dyspnea. She is tolerating her atorvastatin well. I am going to recheck a lipid profile.    The following portions of the patient's history were reviewed and updated as appropriate: allergies, current medications, past family history, past medical history, past social history, past surgical history and problem list.    Problem List:  Patient Active Problem List   Diagnosis   • Family history of breast cancer in mother   • Mixed hyperlipidemia   • Tobacco use   • Valvular heart disease       Past Medical History:  Past Medical History:    Diagnosis Date   • Hyperlipidemia    • Retinal detachment        Past Surgical History:  Past Surgical History:   Procedure Laterality Date   • COLONOSCOPY N/A 8/25/2017    Procedure: COLONOSCOPY INTO CECUM WITH COLD BX POLYPECTOMY ;  Surgeon: Jina Calixto MD;  Location: [facility] ENDOSCOPY;  Service:    • EXTERNAL EAR SURGERY      ripped earlobe   • EYE SURGERY     • PILONIDAL CYST / SINUS EXCISION     • TUBAL ABDOMINAL LIGATION         Social History:  Social History     Socioeconomic History   • Marital status:      Spouse name: Not on file   • Number of children: Not on file   • Years of education: Not on file   • Highest education level: Not on file   Tobacco Use   • Smoking status: Current Every Day Smoker     Packs/day: 1.00     Years: 25.00     Pack years: 25.00     Types: Cigarettes   • Smokeless tobacco: Never Used   Substance and Sexual Activity   • Alcohol use: Yes     Comment: Occasional   • Drug use: Not Currently   • Sexual activity: Yes     Partners: Male     Birth control/protection: Post-menopausal       Allergies:  No Known Allergies    Immunizations:    There is no immunization history on file for this patient.    ROS:  Review of Systems   Constitution: Negative for chills, decreased appetite, fever, malaise/fatigue, weight gain and weight loss.   HENT: Negative for congestion, hoarse voice, nosebleeds and sore throat.    Eyes: Negative for blurred vision, double vision and visual disturbance.   Cardiovascular: Negative for chest pain, claudication, dyspnea on exertion, irregular heartbeat, leg swelling, near-syncope, orthopnea, palpitations, paroxysmal nocturnal dyspnea and syncope.   Respiratory: Negative for cough, hemoptysis, shortness of breath, sleep disturbances due to breathing, snoring, sputum production and wheezing.    Endocrine: Negative for cold intolerance, heat intolerance, polydipsia and polyuria.   Hematologic/Lymphatic: Negative for adenopathy and bleeding problem.  "Does not bruise/bleed easily.   Skin: Negative for flushing, itching, nail changes and rash.   Musculoskeletal: Negative for arthritis, back pain, joint pain, muscle cramps, muscle weakness, myalgias and neck pain.   Gastrointestinal: Negative for bloating, abdominal pain, anorexia, change in bowel habit, constipation, diarrhea, heartburn, hematemesis, hematochezia, jaundice, melena, nausea and vomiting.   Genitourinary: Negative for dysuria, hematuria and nocturia.   Neurological: Negative for brief paralysis, disturbances in coordination, excessive daytime sleepiness, dizziness, headaches, light-headedness, loss of balance, numbness, paresthesias, seizures and vertigo.   Psychiatric/Behavioral: Negative for altered mental status and depression. The patient is not nervous/anxious.    Allergic/Immunologic: Negative for environmental allergies and hives.          Objective:         /63   Pulse 68   Ht 167.6 cm (66\")   Wt 83.9 kg (185 lb)   BMI 29.86 kg/m²     Physical Exam  Unable to perform physical exam secondary to telephone visit  In-Office Procedure(s):  Procedures    ASCVD RIsk Score::  The 10-year ASCVD risk score (Brighton HILARIO Jr., et al., 2013) is: 8.7%    Values used to calculate the score:      Age: 60 years      Sex: Female      Is Non- : No      Diabetic: No      Tobacco smoker: Yes      Systolic Blood Pressure: 107 mmHg      Is BP treated: Yes      HDL Cholesterol: 37 mg/dL      Total Cholesterol: 223 mg/dL    Recent Radiology:  Imaging Results (Most Recent)     None          Lab Review:   not applicable             Assessment:          Diagnosis Plan   1. Mixed hyperlipidemia     2. Valvular heart disease            Plan:      I am going to send a lab requisition to her home for a fasting lipid profile and CMP. She will have this obtained in January 2021. She is going to continue her atorvastatin.    She will monitor her blood pressure and let me know if she begins to see " readings greater than 130/80    We did rediscuss her echocardiogram which demonstrated mild mitral and tricuspid insufficiency. I have reiterated that those mild valvular issues should not be causing any medical symptoms.    Length of telephone visit was 11 minutes and 2 seconds  EMR documentation was 10 minutes    Level of Care:                 Millicent Wagner MD  12/30/20  .

## 2021-04-09 DIAGNOSIS — I10 ESSENTIAL HYPERTENSION: ICD-10-CM

## 2021-04-09 RX ORDER — LISINOPRIL 10 MG/1
TABLET ORAL
Qty: 90 TABLET | Refills: 3 | Status: SHIPPED | OUTPATIENT
Start: 2021-04-09 | End: 2022-03-17

## 2021-08-04 DIAGNOSIS — E78.2 MIXED HYPERLIPIDEMIA: ICD-10-CM

## 2021-08-04 RX ORDER — ATORVASTATIN CALCIUM 10 MG/1
10 TABLET, FILM COATED ORAL DAILY
Qty: 30 TABLET | Refills: 0 | Status: SHIPPED | OUTPATIENT
Start: 2021-08-04 | End: 2021-12-09

## 2021-10-04 ENCOUNTER — TELEMEDICINE (OUTPATIENT)
Dept: CARDIOLOGY | Facility: CLINIC | Age: 61
End: 2021-10-04

## 2021-10-04 VITALS
HEIGHT: 66 IN | BODY MASS INDEX: 28.93 KG/M2 | HEART RATE: 69 BPM | DIASTOLIC BLOOD PRESSURE: 62 MMHG | SYSTOLIC BLOOD PRESSURE: 114 MMHG | WEIGHT: 180 LBS

## 2021-10-04 DIAGNOSIS — Z72.0 TOBACCO USE: ICD-10-CM

## 2021-10-04 DIAGNOSIS — I38 VALVULAR HEART DISEASE: Primary | ICD-10-CM

## 2021-10-04 DIAGNOSIS — I10 ESSENTIAL HYPERTENSION: ICD-10-CM

## 2021-10-04 DIAGNOSIS — E78.2 MIXED HYPERLIPIDEMIA: ICD-10-CM

## 2021-10-04 PROCEDURE — 99442 PR PHYS/QHP TELEPHONE EVALUATION 11-20 MIN: CPT | Performed by: INTERNAL MEDICINE

## 2021-10-04 RX ORDER — LATANOPROSTENE BUNOD 0.24 MG/ML
SOLUTION/ DROPS OPHTHALMIC DAILY
COMMUNITY

## 2021-10-04 NOTE — PROGRESS NOTES
"Chief Complaint  Follow-up    Subjective    History of Present Illness      You have chosen to receive care through a telephone visit. Do you consent to use a telephone visit for your medical care today? Yes    I had a telephone visit with Ms. Higgins today.  She is a 61-year-old female with a history of valvular heart disease and dyslipidemia as well as tobacco abuse.  She had an echocardiogram in November 2021 which demonstrated an ejection fraction of 64%.  She had mild mitral and tricuspid insufficiency.  She had a nuclear stress test which demonstrated a hyperdynamic ejection fraction of 70% and normal myocardial perfusion.    Ms. norris states that she is doing fairly well.  She is not complaining of chest discomfort or shortness of air.  She does notice that if she overexerts herself she has a pounding sensation in her chest which resolves when her activity is completed.  No dizziness or near syncope.  No orthopnea or paroxysmal nocturnal dyspnea.  She remains active in her [facility].  Her blood pressure is well controlled.  I am going to send her a lab slip to have some blood work performed.  Objective   Vital Signs:   /62   Pulse 69   Ht 167.6 cm (66\")   Wt 81.6 kg (180 lb)   BMI 29.05 kg/m²     Physical Exam   Unable to perform physical exam secondary to visit due to telephone visit    Result Review :   The following data was reviewed by: Millicent Wagner MD on 10/04/2021:                          Assessment and Plan    1. Valvular heart disease  Stable    2. Mixed hyperlipidemia  Stable I am sending out a lipid profile her heart have lab work obtained    3. Tobacco use  She continues to smoke approximately 1/2 pack of cigarettes per day    4.  Hypertension  Stable    Length of telephone visit was 11 minutes      Follow Up   Return in about 6 months (around 4/4/2022).  Patient was given instructions and counseling regarding her condition or for health maintenance advice. Please see specific " information pulled into the AVS if appropriate.

## 2021-11-02 ENCOUNTER — PREP FOR SURGERY (OUTPATIENT)
Dept: SURGERY | Facility: SURGERY CENTER | Age: 61
End: 2021-11-02

## 2021-11-02 DIAGNOSIS — Z86.010 PERSONAL HISTORY OF COLONIC POLYPS: Primary | ICD-10-CM

## 2021-11-09 PROBLEM — Z86.010 PERSONAL HISTORY OF COLONIC POLYPS: Status: ACTIVE | Noted: 2021-11-09

## 2021-11-09 PROBLEM — Z86.0100 PERSONAL HISTORY OF COLONIC POLYPS: Status: ACTIVE | Noted: 2021-11-09

## 2021-12-07 NOTE — SIGNIFICANT NOTE
Education provided the Patient on the following:    - Nothing to Eat or Drink after MN the night before the procedure    - Avoid red/purple fluids while completing their bowel prep as ordered by physician  -Contact Gastrointerologist office for any questions about specific details regarding colon prep    -You will need to have someone drive you home after your colonoscopy and remain with you for 24 hours after the procedure  - The date of your Surgery, you may have one visitor at bedside or within 10-15 minutes of [facility]  -Please wear warm socks when you arrive for your colonoscopy  -Remove all jewelry and leave any valuables before arriving the day of your procedure (all will have to be removed before leaving preop)  -You will need to arrive at 0915 on 12/10 for your colonoscopy    -Feel free to contact us at: [phone] with any additional questions/concerns

## 2021-12-09 DIAGNOSIS — E78.2 MIXED HYPERLIPIDEMIA: ICD-10-CM

## 2021-12-09 RX ORDER — ATORVASTATIN CALCIUM 10 MG/1
10 TABLET, FILM COATED ORAL DAILY
Qty: 90 TABLET | Refills: 3 | Status: SHIPPED | OUTPATIENT
Start: 2021-12-09 | End: 2022-11-02

## 2021-12-10 ENCOUNTER — ANESTHESIA (OUTPATIENT)
Dept: SURGERY | Facility: SURGERY CENTER | Age: 61
End: 2021-12-10

## 2021-12-10 ENCOUNTER — HOSPITAL ENCOUNTER (OUTPATIENT)
Facility: SURGERY CENTER | Age: 61
Setting detail: HOSPITAL OUTPATIENT SURGERY
Discharge: HOME | End: 2021-12-10
Attending: SURGERY | Admitting: SURGERY

## 2021-12-10 ENCOUNTER — ANESTHESIA EVENT (OUTPATIENT)
Dept: SURGERY | Facility: SURGERY CENTER | Age: 61
End: 2021-12-10

## 2021-12-10 VITALS
HEIGHT: 66 IN | WEIGHT: 202.6 LBS | TEMPERATURE: 98.1 F | HEART RATE: 59 BPM | DIASTOLIC BLOOD PRESSURE: 82 MMHG | SYSTOLIC BLOOD PRESSURE: 93 MMHG | RESPIRATION RATE: 18 BRPM | BODY MASS INDEX: 32.56 KG/M2 | OXYGEN SATURATION: 100 %

## 2021-12-10 DIAGNOSIS — Z86.010 PERSONAL HISTORY OF COLONIC POLYPS: ICD-10-CM

## 2021-12-10 PROCEDURE — 45380 COLONOSCOPY AND BIOPSY: CPT | Performed by: SURGERY

## 2021-12-10 PROCEDURE — 0DBK8ZZ EXCISION OF ASCENDING COLON, VIA NATURAL OR ARTIFICIAL OPENING ENDOSCOPIC: ICD-10-PCS | Performed by: SURGERY

## 2021-12-10 PROCEDURE — S0260 H&P FOR SURGERY: HCPCS | Performed by: SURGERY

## 2021-12-10 PROCEDURE — 88305 TISSUE EXAM BY PATHOLOGIST: CPT | Performed by: SURGERY

## 2021-12-10 PROCEDURE — 25010000002 PROPOFOL 10 MG/ML EMULSION: Performed by: ANESTHESIOLOGY

## 2021-12-10 RX ORDER — SODIUM CHLORIDE, SODIUM LACTATE, POTASSIUM CHLORIDE, CALCIUM CHLORIDE 600; 310; 30; 20 MG/100ML; MG/100ML; MG/100ML; MG/100ML
1000 INJECTION, SOLUTION INTRAVENOUS CONTINUOUS
Status: DISCONTINUED | OUTPATIENT
Start: 2021-12-10 | End: 2021-12-10 | Stop reason: HOSPADM

## 2021-12-10 RX ORDER — LIDOCAINE HYDROCHLORIDE 10 MG/ML
0.5 INJECTION, SOLUTION INFILTRATION; PERINEURAL ONCE AS NEEDED
Status: DISCONTINUED | OUTPATIENT
Start: 2021-12-10 | End: 2021-12-10 | Stop reason: HOSPADM

## 2021-12-10 RX ORDER — PROPOFOL 10 MG/ML
VIAL (ML) INTRAVENOUS AS NEEDED
Status: DISCONTINUED | OUTPATIENT
Start: 2021-12-10 | End: 2021-12-10 | Stop reason: SURG

## 2021-12-10 RX ORDER — LIDOCAINE HYDROCHLORIDE 20 MG/ML
INJECTION, SOLUTION INFILTRATION; PERINEURAL AS NEEDED
Status: DISCONTINUED | OUTPATIENT
Start: 2021-12-10 | End: 2021-12-10 | Stop reason: SURG

## 2021-12-10 RX ORDER — MAGNESIUM HYDROXIDE 1200 MG/15ML
LIQUID ORAL AS NEEDED
Status: DISCONTINUED | OUTPATIENT
Start: 2021-12-10 | End: 2021-12-10 | Stop reason: HOSPADM

## 2021-12-10 RX ORDER — SODIUM CHLORIDE 0.9 % (FLUSH) 0.9 %
10 SYRINGE (ML) INJECTION AS NEEDED
Status: DISCONTINUED | OUTPATIENT
Start: 2021-12-10 | End: 2021-12-10 | Stop reason: HOSPADM

## 2021-12-10 RX ADMIN — SODIUM CHLORIDE, POTASSIUM CHLORIDE, SODIUM LACTATE AND CALCIUM CHLORIDE 1000 ML: 600; 310; 30; 20 INJECTION, SOLUTION INTRAVENOUS at 09:23

## 2021-12-10 RX ADMIN — PROPOFOL 140 MCG/KG/MIN: 10 INJECTION, EMULSION INTRAVENOUS at 11:11

## 2021-12-10 RX ADMIN — LIDOCAINE HYDROCHLORIDE 60 MG: 20 INJECTION, SOLUTION INFILTRATION; PERINEURAL at 11:11

## 2021-12-10 RX ADMIN — PROPOFOL 100 MG: 10 INJECTION, EMULSION INTRAVENOUS at 11:11

## 2021-12-10 NOTE — ANESTHESIA PREPROCEDURE EVALUATION
Anesthesia Evaluation     Patient summary reviewed and Nursing notes reviewed   NPO Solid Status: > 8 hours  NPO Liquid Status: > 2 hours           Airway   Mallampati: II  TM distance: >3 FB  Neck ROM: full  No difficulty expected  Dental - normal exam         Pulmonary - normal exam   (+) a smoker Current,   Cardiovascular - normal exam  Exercise tolerance: good (4-7 METS)    (+) hypertension, hyperlipidemia,       Neuro/Psych- negative ROS  GI/Hepatic/Renal/Endo - negative ROS     Musculoskeletal (-) negative ROS    Abdominal    Substance History      OB/GYN          Other - negative ROS                       Anesthesia Plan    ASA 3     MAC     intravenous induction     Anesthetic plan, all risks, benefits, and alternatives have been provided, discussed and informed consent has been obtained with: patient.

## 2021-12-10 NOTE — ANESTHESIA POSTPROCEDURE EVALUATION
"Patient: Urszula Higgins    Procedure Summary     Date: 12/10/21 Room / Location: [facility]  / [facility]    Anesthesia Start: 1106 Anesthesia Stop: 1143    Procedure: COLONOSCOPY with polypectomy (N/A ) Diagnosis:       Personal history of colonic polyps      (Personal history of colonic polyps [Z86.010])    Surgeons: Jina Calixto MD Provider: Giovanny Cota MD    Anesthesia Type: MAC ASA Status: 3          Anesthesia Type: MAC    Vitals  Vitals Value Taken Time   /82 12/10/21 1145   Temp 36.7 °C (98.1 °F) 12/10/21 1142   Pulse 57 12/10/21 1145   Resp 12 12/10/21 1145   SpO2 99 % 12/10/21 1145           Post Anesthesia Care and Evaluation    Patient location during evaluation: bedside  Patient participation: complete - patient participated  Level of consciousness: awake and alert  Pain management: adequate  Airway patency: patent  Anesthetic complications: No anesthetic complications  PONV Status: none  Cardiovascular status: acceptable and hemodynamically stable  Respiratory status: acceptable and spontaneous ventilation  Hydration status: acceptable    Comments: /82   Pulse 57   Temp 36.7 °C (98.1 °F) (Infrared)   Resp 12   Ht 167.6 cm (66\")   Wt 91.9 kg (202 lb 9.6 oz)   SpO2 99%   BMI 32.70 kg/m²         "

## 2021-12-10 NOTE — DISCHARGE INSTRUCTIONS
Repeat colonoscopy in 3 years.  Please give patient information on diverticulosis.  Call my office in 2 business days for pathology results.

## 2021-12-10 NOTE — OP NOTE
Operative Note:    Pre-op dx: Personal history of polyps    Post-op dx: Diverticulosis, descending colon polyp, rectal polyp    Procedure: Colonoscopy with cold polypectomy x2    Surgeon: Jina Calixto MD    Anesthesia: MAC    EBL: Minimal    Specimen:    Order Name Source Comment Collection Info Order Time   TISSUE PATHOLOGY EXAM Large Intestine, Left / Descending Colon Descending colon polyp Collected By: Jina Calixto MD 12/10/2021 11:34 AM     Release to patient   Immediate            Complications: none    Procedure:    Colonoscopy:  A digital rectal examination was performed which revealed no rectal masses.  Scope was introduced into the rectum and advanced into the sigmoid, descending colon, splenic flexure, transverse colon, hepatic flexure, ascending colon and into the cecum.  Patient had diverticula throughout the sigmoid and descending colon.  A small semisessile polyp was noted in the descending colon and removed in its entirety with cold biopsy forceps and submitted to pathology.  A second polyp was noted in the rectum and was removed in its entirety with a cold biopsy forceps and submitted to pathology cecum was identified by the ileocecal valve and appendiceal orifice.  There was no evidence of any masses, AV malformation or inflammation.  The bowel preparation was excellent. The scope was slowly withdrawn and a second look was performed.  Upon the second look, there were no new findings.  The colon was desufflated and the procedure was terminated.   Patient was transferred to recovery room in stable condition.      Assessment/Plan:  Repeat colonoscopy in 3 years.  Diverticulosis  Personal history of polyps  Ascending colon polyp  Rectal polyp    Jina Calixto MD  General, Minimally Invasive and Endoscopic Surgery  [facility]    [address]   [address]    [address]  [place] [address]               [place] [address]    P:  [phone]  F: [phone]    Cc:  Gregory Lauren MD

## 2021-12-10 NOTE — H&P
Date: 12/10/2021     Patient: Urszula Higgins    : 1960   [id number]     CC:  Screening Colonoscopy, with personal history of colon polyps    History:   The patient is a 61 y.o. female of Gregory Lauren MD  who presents to discuss screening colonoscopy with personal history of colon polyps, last colonoscopy was . The patient currently has no complaints.  Patient denies any history of nausea, abdominal pain, weight loss, change in bowel habits or rectal bleeding.  Patient denies melena, hematochezia or BRBPR.  No family history of ulcerative colitis, Crohn's disease or familial polyposis.    The following portions of the patient's history were reviewed and updated as appropriate: allergies, current medications, past family history, past medical history, past social history, past surgical history and problem list.    Past Medical History:   Diagnosis Date   • Hyperlipidemia    • Retinal detachment       Past Surgical History:   Procedure Laterality Date   • COLONOSCOPY N/A 2017    Procedure: COLONOSCOPY INTO CECUM WITH COLD BX POLYPECTOMY ;  Surgeon: Jina Calixto MD;  Location: [facility] ENDOSCOPY;  Service:    • EXTERNAL EAR SURGERY      ripped earlobe   • EYE SURGERY     • PILONIDAL CYST / SINUS EXCISION     • TUBAL ABDOMINAL LIGATION       Medications:   Medications Prior to Admission   Medication Sig Dispense Refill Last Dose   • Latanoprostene Bunod (Vyzulta) 0.024 % solution Apply  to eye(s) as directed by provider Daily.   12/10/2021 at Unknown time   • lisinopril (PRINIVIL,ZESTRIL) 10 MG tablet TAKE 1 TABLET BY MOUTH EVERY DAY 90 tablet 3 12/10/2021 at Unknown time   • timolol (TIMOPTIC) 0.5 % ophthalmic solution INSTILL 1 DROP INTO BOTH EYES IN THE MORNING   12/10/2021 at Unknown time   • atorvastatin (LIPITOR) 10 MG tablet Take 1 tablet by mouth Daily. 90 tablet 3 2021   • calcium carbonate 1250 MG capsule Take 1,250 mg by mouth.   2021   • Fish  Oil-Cholecalciferol (FISH OIL + D3) 4989-0177 MG-UNIT capsule Take 2 g by mouth.   12/7/2021   • L-Arginine 1000 MG tablet Take  by mouth.   More than a month at Unknown time   • Multiple Vitamins-Minerals (MULTIVITAMIN WITH MINERALS) tablet tablet Take 1 tablet by mouth Daily.   12/6/2021   • Olopatadine HCl (Pazeo) 0.7 % solution Apply  to eye(s) as directed by provider As Needed.   More than a month at Unknown time   • pilocarpine (PILOCAR) 1 % ophthalmic solution As Needed.   More than a month at Unknown time     Allergies: Patient has no known allergies.   Social History:  Social History     Socioeconomic History   • Marital status:    Tobacco Use   • Smoking status: Current Every Day Smoker     Packs/day: 1.00     Years: 25.00     Pack years: 25.00     Types: Cigarettes   • Smokeless tobacco: Never Used   Substance and Sexual Activity   • Alcohol use: Yes     Comment: Occasional   • Drug use: Not Currently   • Sexual activity: Yes     Partners: Male     Birth control/protection: Post-menopausal      Family History   Problem Relation Age of Onset   • Breast cancer Mother    • Heart attack Paternal Grandmother    • Heart attack Paternal Grandfather         Review of Systems:   General: Patient reports good health  Eyes: No eye problems  Ears, nose, mouth and throat: No rhinitis, no hearing problems, no chronic cough  Cardiovascular/heart: Denies palpitations, syncope or chest pain  Respiratory/lung: Denies shortness of breath, hemoptysis, or dyspnea on exertion   Genital/urinary: No frequency, hematuria or dysuria  Hematological/lymphatic: Denies anemia or other problems  Musculoskeletal: No joint pain, no defects  Skin: No psoriasis or other skin issues  Neurological: No seizures or other neurological problems  Psychiatric: None  Endocrine: Negative  Gastro-intestinal: No constipation, no diarrhea, no melena, no hematochezia    /72 (BP Location: Left arm, Patient Position: Lying)   Pulse 66    "Temp 97.5 °F (36.4 °C) (Temporal)   Resp 16   Ht 167.6 cm (66\")   Wt 91.9 kg (202 lb 9.6 oz)   SpO2 99%   BMI 32.70 kg/m²     Physical Examination:  General: Alert and oriented x3 in no acute distress  HEENT: Normal cephalic, atraumatic, PERRLA, EOMI, sclera anicteric, moist mucous membranes, neck is supple, no JVD, no carotid bruits, no thyromegaly no adenopathy  Chest: CTA and percussion  CVA: RRR, normal S1-S2, no murmurs, no gallops or rubs  Abdomen: Positive BS, soft, nondistended, nontender, no rebound, no guarding, no hernias, no organomegaly and no masses  Extremities: Full range of motion, no clubbing, no cyanosis or edema  Neurovascular: Grossly intact  Debilities: None  Emotional Behavior: Appropriate     Impression:  61 y.o. female for screening colonoscopy with personal history of colon polyps.    Plan:  Patient is presenting for screening colonoscopy with personal history of colon polyps.  I have recommended that the patient undergo a screening colonoscopy in accordance of American Cancer Society's guidelines.  I have discussed this procedure in detail with the patient.  I have discussed the risks, benefits and alternatives.  I have discussed the risk of anesthesia, bleeding and perforation.  Patient understands these risks, benefits and alternatives and wishes to proceed.      Jina Calixto MD  General, Minimally Invasive and Endoscopic Surgery  [facility]    [address]    [address]  [address] [address]    P: [phone]  F: [phone]    Cc:  Gregory Lauren MD  "

## 2021-12-13 LAB
LAB AP CASE REPORT: NORMAL
LAB AP CLINICAL INFORMATION: NORMAL
PATH REPORT.FINAL DX SPEC: NORMAL
PATH REPORT.GROSS SPEC: NORMAL

## 2022-01-30 PROBLEM — I38 VALVULAR HEART DISEASE: Chronic | Status: ACTIVE | Noted: 2020-12-30

## 2022-01-30 PROBLEM — I10 ESSENTIAL HYPERTENSION: Chronic | Status: ACTIVE | Noted: 2021-10-04

## 2022-01-30 PROBLEM — E78.2 MIXED HYPERLIPIDEMIA: Chronic | Status: ACTIVE | Noted: 2020-10-11

## 2022-01-30 PROBLEM — Z72.0 TOBACCO USE: Chronic | Status: ACTIVE | Noted: 2020-10-12

## 2022-01-31 ENCOUNTER — OFFICE VISIT (OUTPATIENT)
Dept: CARDIOLOGY | Facility: CLINIC | Age: 62
End: 2022-01-31

## 2022-01-31 VITALS
WEIGHT: 199 LBS | SYSTOLIC BLOOD PRESSURE: 118 MMHG | HEART RATE: 81 BPM | BODY MASS INDEX: 31.98 KG/M2 | HEIGHT: 66 IN | DIASTOLIC BLOOD PRESSURE: 62 MMHG

## 2022-01-31 DIAGNOSIS — R07.89 CHEST PAIN, ATYPICAL: ICD-10-CM

## 2022-01-31 DIAGNOSIS — I38 VALVULAR HEART DISEASE: Primary | Chronic | ICD-10-CM

## 2022-01-31 DIAGNOSIS — E78.2 MIXED HYPERLIPIDEMIA: Chronic | ICD-10-CM

## 2022-01-31 DIAGNOSIS — Z72.0 TOBACCO USE: Chronic | ICD-10-CM

## 2022-01-31 DIAGNOSIS — R00.2 PALPITATIONS: ICD-10-CM

## 2022-01-31 DIAGNOSIS — I10 ESSENTIAL HYPERTENSION: Chronic | ICD-10-CM

## 2022-01-31 PROCEDURE — 99214 OFFICE O/P EST MOD 30 MIN: CPT | Performed by: INTERNAL MEDICINE

## 2022-01-31 RX ORDER — PROGESTERONE 200 MG/1
1 CAPSULE ORAL DAILY
COMMUNITY
Start: 2021-12-07

## 2022-01-31 RX ORDER — LEVOTHYROXINE AND LIOTHYRONINE 76; 18 UG/1; UG/1
1 TABLET ORAL DAILY
COMMUNITY
Start: 2021-12-01

## 2022-01-31 RX ORDER — ESTRADIOL 2 MG/1
1 TABLET ORAL DAILY
COMMUNITY
Start: 2021-12-09

## 2022-01-31 NOTE — PROGRESS NOTES
"Chief Complaint  Cardiac Valve Problem    Subjective    History of Present Illness      I saw Ms. Higgins in the office today. She is a 61-year-old female with a history of valvular heart disease and dyslipidemia as well as tobacco abuse.  She had an echocardiogram in November 2021 which demonstrated an ejection fraction of 64%.  She had mild mitral and tricuspid insufficiency.  She had a nuclear stress test which demonstrated a hyperdynamic ejection fraction of 70% and normal myocardial perfusion.    Ms. Higgins states that she recently developed COVID-19 and received a monoclonal antibody infusion.  She states that shortly thereafter she developed palpitations and fleeting chest discomfort.  She states that symptoms were persistent for approximately 2 days and were not intense but noticeable.  Her symptoms have since resolved.  She denies any further chest discomfort or palpitations.  No lower extremity edema, orthopnea or paroxysmal nocturnal dyspnea.  No dizziness or near syncope.  She does continue to use tobacco and I have encouraged her to stop.    Objective   Vital Signs:   /62   Pulse 81   Ht 167.6 cm (66\")   Wt 90.3 kg (199 lb)   BMI 32.12 kg/m²     Constitutional:       Appearance: Healthy appearance. Not in distress.   Neck:      Thyroid: Thyroid normal.      Vascular: No JVR. JVD normal.   Pulmonary:      Effort: Pulmonary effort is normal.      Breath sounds: Normal breath sounds. No wheezing. No rhonchi. No rales.   Chest:      Chest wall: Not tender to palpatation.   Cardiovascular:      PMI at left midclavicular line. Normal rate. Regular rhythm. Normal S1. Normal S2.      Murmurs: There is no murmur.      No gallop. No click. No rub.   Pulses:     Intact distal pulses.   Edema:     Peripheral edema absent.   Abdominal:      General: Bowel sounds are normal. There is no distension.      Palpations: Abdomen is soft. There is no hepatomegaly.      Tenderness: There is no abdominal tenderness. "   Musculoskeletal: Normal range of motion.         General: No tenderness.      Cervical back: Normal range of motion and neck supple. Skin:     General: Skin is warm and dry.   Neurological:      General: No focal deficit present.      Mental Status: Alert and oriented to person, place and time.         Result Review :   The following data was reviewed by: Millicent Wagner MD on 01/31/2022:                Component   Ref Range & Units 1 yr ago Resulting Agency Comments   Triglycerides   <150 mg/dL 146  [facility] Triglycerides Reference Ranges:   Borderline High: 150-199   High: 200-499   Very High: >500   Cholesterol   0 - 199 mg/dL 223 High   [facility] (note)   Adult and Pediatric Lipid Interpretation:   Adult Cholesterol Reference Ranges (>18 years):    Desirable: <200   Borderline: 200-239         High: >240   Pediatric Lipid Interpretation (0-18 years):   Suggest obtaining two Cholesterol levels on     children at high risk inticated by a family history of   hypercholesterolemia or premature coronary heart disease.   Pediatric Cholesterol Reference Ranges:     <170  -recommended   170-185 -Suggest counseling about diet and other risk              factors and followed at 1 year intervals.   185-200 -Suggest special dietary instruction and            evaluation of other risk  factors.           >200  -Strict diet intervention is indicated.   Non-responders should be considered for  treatment with   Lipid lowering agent.  Hereditary  hypercholesterolemias   should be considered.   HDL Cholesterol   >50 mg/dL 37 Low   [facility]    LDL Cholesterol-Calculated   0 - 100 mg/dL 157 High   [facility]   LDL Cholesterol Reference Range   <100     Optimal   100-129  Near optimal/above optimal   130-159  Borderline high   160-189  High   >=190    Very high   VLDL   0 - 30 mg/dL 29  [facility]    CHOL/HDL Ratio   RATIO 6.0  [facility]    Is patient fasting?  Yes  [facility] ([id number])     Specimen Collected: 02/13/20  1:17 PM Last Resulted: 02/13/20  7:54 PM   Received From: [facility]  Result Receiv                 Assessment and Plan    1. Essential hypertension  Controlled    2. Mixed hyperlipidemia  She is on atorvastatin.  Her LDL is 157.  Would consider increasing her atorvastatin to 20 mg daily    3. Valvular heart disease  Stable.    4. Tobacco use  She has been counseled to discontinue tobacco use    5. Chest pain, atypical  Occurred after monoclonal antibody infusion.  She did have an EKG at [facility] which was read as abnormal R wave progression.    6. Palpitations  Now resolved.    I will obtain echocardiogram for follow-up on her valvular heart disease.  But also to assess left ventricular function in view of her recent chest discomfort and COVID-19 as well as monoclonal antibody infusion    Urszula Mannie Higgins  reports that she has been smoking cigarettes. She has a 25.00 pack-year smoking history. She has never used smokeless tobacco.. I have educated her on the risk of diseases from using tobacco products such as cancer, COPD and heart disease.     I advised her to quit and she is not willing to quit.    I spent 3.5 minutes counseling the patient.             Follow Up   No follow-ups on file.  Patient was given instructions and counseling regarding her condition or for health maintenance advice. Please see specific information pulled into the AVS if appropriate.

## 2022-03-08 ENCOUNTER — HOSPITAL ENCOUNTER (OUTPATIENT)
Dept: CARDIOLOGY | Facility: HOSPITAL | Age: 62
Discharge: HOME | End: 2022-03-08
Admitting: INTERNAL MEDICINE

## 2022-03-08 VITALS
SYSTOLIC BLOOD PRESSURE: 132 MMHG | WEIGHT: 199 LBS | DIASTOLIC BLOOD PRESSURE: 76 MMHG | HEIGHT: 66 IN | HEART RATE: 60 BPM | BODY MASS INDEX: 31.98 KG/M2

## 2022-03-08 DIAGNOSIS — I38 VALVULAR HEART DISEASE: ICD-10-CM

## 2022-03-08 DIAGNOSIS — R07.89 CHEST PAIN, ATYPICAL: ICD-10-CM

## 2022-03-08 DIAGNOSIS — R00.2 PALPITATIONS: ICD-10-CM

## 2022-03-08 LAB
AORTIC ARCH: 2.8 CM
ASCENDING AORTA: 2.8 CM
BH CV ECHO LEFT VENTRICLE GLOBAL LONGITUDINAL STRAIN: -17.8 %
BH CV ECHO MEAS - ACS: 1.93 CM
BH CV ECHO MEAS - AO MAX PG: 3.8 MMHG
BH CV ECHO MEAS - AO MEAN PG: 2.35 MMHG
BH CV ECHO MEAS - AO ROOT DIAM: 2.9 CM
BH CV ECHO MEAS - AO V2 MAX: 97.7 CM/SEC
BH CV ECHO MEAS - AO V2 VTI: 22.5 CM
BH CV ECHO MEAS - AVA(I,D): 2.8 CM2
BH CV ECHO MEAS - EDV(CUBED): 97.7 ML
BH CV ECHO MEAS - EDV(MOD-SP2): 88 ML
BH CV ECHO MEAS - EDV(MOD-SP4): 79 ML
BH CV ECHO MEAS - EF(MOD-BP): 58.4 %
BH CV ECHO MEAS - EF(MOD-SP2): 58 %
BH CV ECHO MEAS - EF(MOD-SP4): 58.2 %
BH CV ECHO MEAS - ESV(CUBED): 39 ML
BH CV ECHO MEAS - ESV(MOD-SP2): 37 ML
BH CV ECHO MEAS - ESV(MOD-SP4): 33 ML
BH CV ECHO MEAS - FS: 26.4 %
BH CV ECHO MEAS - IVS/LVPW: 1.22 CM
BH CV ECHO MEAS - IVSD: 1.07 CM
BH CV ECHO MEAS - LAT PEAK E' VEL: 12.1 CM/SEC
BH CV ECHO MEAS - LV DIASTOLIC VOL/BSA (35-75): 39.6 CM2
BH CV ECHO MEAS - LV MASS(C)D: 154.1 GRAMS
BH CV ECHO MEAS - LV MAX PG: 3.3 MMHG
BH CV ECHO MEAS - LV MEAN PG: 1.63 MMHG
BH CV ECHO MEAS - LV SYSTOLIC VOL/BSA (12-30): 16.5 CM2
BH CV ECHO MEAS - LV V1 MAX: 90.8 CM/SEC
BH CV ECHO MEAS - LV V1 VTI: 20.8 CM
BH CV ECHO MEAS - LVIDD: 4.6 CM
BH CV ECHO MEAS - LVIDS: 3.4 CM
BH CV ECHO MEAS - LVOT AREA: 3 CM2
BH CV ECHO MEAS - LVOT DIAM: 1.96 CM
BH CV ECHO MEAS - LVPWD: 0.88 CM
BH CV ECHO MEAS - MED PEAK E' VEL: 8.2 CM/SEC
BH CV ECHO MEAS - MV A MAX VEL: 63 CM/SEC
BH CV ECHO MEAS - MV DEC SLOPE: 283.9 CM/SEC2
BH CV ECHO MEAS - MV DEC TIME: 0.19 MSEC
BH CV ECHO MEAS - MV E MAX VEL: 65.1 CM/SEC
BH CV ECHO MEAS - MV E/A: 1.03
BH CV ECHO MEAS - MV MAX PG: 2.7 MMHG
BH CV ECHO MEAS - MV MEAN PG: 1.21 MMHG
BH CV ECHO MEAS - MV V2 VTI: 27.9 CM
BH CV ECHO MEAS - MVA(VTI): 2.24 CM2
BH CV ECHO MEAS - PA ACC TIME: 0.08 SEC
BH CV ECHO MEAS - PA PR(ACCEL): 41 MMHG
BH CV ECHO MEAS - PA V2 MAX: 99.1 CM/SEC
BH CV ECHO MEAS - PULM A REVS DUR: 0.14 SEC
BH CV ECHO MEAS - PULM A REVS VEL: 42 CM/SEC
BH CV ECHO MEAS - PULM DIAS VEL: 37.7 CM/SEC
BH CV ECHO MEAS - PULM SYS VEL: 38.6 CM/SEC
BH CV ECHO MEAS - RAP SYSTOLE: 3 MMHG
BH CV ECHO MEAS - RV MAX PG: 1.42 MMHG
BH CV ECHO MEAS - RV V1 MAX: 59.6 CM/SEC
BH CV ECHO MEAS - RV V1 VTI: 12.6 CM
BH CV ECHO MEAS - RVOT DIAM: 2.37 CM
BH CV ECHO MEAS - SI(MOD-SP2): 25.6 ML/M2
BH CV ECHO MEAS - SI(MOD-SP4): 23 ML/M2
BH CV ECHO MEAS - SUP REN AO DIAM: 2.2 CM
BH CV ECHO MEAS - SV(LVOT): 62.4 ML
BH CV ECHO MEAS - SV(MOD-SP2): 51 ML
BH CV ECHO MEAS - SV(MOD-SP4): 46 ML
BH CV ECHO MEAS - SV(RVOT): 55.6 ML
BH CV ECHO MEAS - TAPSE (>1.6): 2.26 CM
BH CV ECHO MEASUREMENTS AVERAGE E/E' RATIO: 6.41
BH CV VAS BP RIGHT ARM: NORMAL MMHG
BH CV XLRA - RV BASE: 3.4 CM
BH CV XLRA - RV LENGTH: 6.7 CM
BH CV XLRA - RV MID: 2.9 CM
BH CV XLRA - TDI S': 10.1 CM/SEC
LEFT ATRIUM VOLUME INDEX: 15.4 ML/M2
MAXIMAL PREDICTED HEART RATE: 160 BPM
SINUS: 3 CM
STJ: 2.8 CM
STRESS TARGET HR: 136 BPM

## 2022-03-08 PROCEDURE — 93306 TTE W/DOPPLER COMPLETE: CPT | Performed by: INTERNAL MEDICINE

## 2022-03-08 PROCEDURE — 93356 MYOCRD STRAIN IMG SPCKL TRCK: CPT

## 2022-03-08 PROCEDURE — 93356 MYOCRD STRAIN IMG SPCKL TRCK: CPT | Performed by: INTERNAL MEDICINE

## 2022-03-08 PROCEDURE — 93306 TTE W/DOPPLER COMPLETE: CPT

## 2022-03-17 DIAGNOSIS — I10 ESSENTIAL HYPERTENSION: ICD-10-CM

## 2022-03-17 RX ORDER — LISINOPRIL 10 MG/1
TABLET ORAL
Qty: 90 TABLET | Refills: 1 | Status: SHIPPED | OUTPATIENT
Start: 2022-03-17 | End: 2022-08-17

## 2022-05-02 NOTE — PROGRESS NOTES
"Chief Complaint  Follow-up    Subjective    History of Present Illness      I saw Ms. Higgins in the office today. She is a 61-year-old female with a history of valvular heart disease and dyslipidemia as well as tobacco abuse. She had COVID-19 in January 2022.  And received monoclonal antibody infusion.    She had a normal nuclear stress test in 2021.    She had an echocardiogram in March 2022 which demonstrated an ejection fraction of 58%.  There is trace mitral insufficiency and mild tricuspid insufficiency    She also states that she was in [place] last month and had an episode of chest discomfort.  She described this as a dull type of discomfort with a duration of approximately 30 seconds.  There was no associated shortness of air, diaphoresis or nausea.  She was able to continue with her vacation without difficulty from a cardiac standpoint.  She does continue to use tobacco, smoking approximately 1/2 pack/day.  She states that she does plan on discontinuing eventually.  No orthopnea or paroxysmal nocturnal dyspnea.  No lower extremity edema.  No palpitations, dizziness or syncope.    Objective   Vital Signs:   /64   Pulse 84   Resp 18   Ht 167.6 cm (66\")   Wt 88.9 kg (196 lb)   SpO2 97%   BMI 31.64 kg/m²     Constitutional:       Appearance: Healthy appearance. Not in distress.   Neck:      Thyroid: Thyroid normal.      Vascular: No JVR. JVD normal.   Pulmonary:      Effort: Pulmonary effort is normal.      Breath sounds: Normal breath sounds. No wheezing. No rhonchi. No rales.   Chest:      Chest wall: Not tender to palpatation.   Cardiovascular:      PMI at left midclavicular line. Normal rate. Regular rhythm. Normal S1. Normal S2.      Murmurs: There is no murmur.      No gallop. No click. No rub.   Pulses:     Intact distal pulses.   Edema:     Peripheral edema ( Her feet are swollen from sunburn) present.  Abdominal:      General: Bowel sounds are normal. There is no distension.      " Palpations: Abdomen is soft. There is no hepatomegaly.      Tenderness: There is no abdominal tenderness.   Musculoskeletal: Normal range of motion.         General: No tenderness.      Cervical back: Normal range of motion and neck supple. Skin:     General: Skin is warm and dry.   Neurological:      General: No focal deficit present.      Mental Status: Alert and oriented to person, place and time.      Gait: Gait is intact.         Result Review :   The following data was reviewed by: Millicent Wagner MD on 05/03/2022:                       ECG 12 Lead    Date/Time: 5/3/2022 10:38 AM  Performed by: Millicent Wagner MD  Authorized by: Millicent Wagner MD   Comparison: compared with previous ECG from 10/12/2020  Similar to previous ECG  Comments: Normal sinus rhythm.  Left atrial abnormality              Assessment and Plan    1. Essential hypertension  Controlled    2. Palpitations  Stable    3. Valvular heart disease  Recent echocardiogram with stable ejection fraction and valvular function    4. Mixed hyperlipidemia  She is on atorvastatin 10 mg daily.  She has not had a recent lipid profile and I am going to obtain that.  I did give her a lab requisition in the office today    5.  Atypical chest pain  She had an episode of chest discomfort in [place] which was atypical for myocardial ischemia.  Her EKG demonstrates no changes to reflect ischemia.  No further testing is needed at this time.  If she has recurrent discomfort we will consider nuclear stress imaging        Follow Up   No follow-ups on file.  Patient was given instructions and counseling regarding her condition or for health maintenance advice. Please see specific information pulled into the AVS if appropriate.

## 2022-05-03 ENCOUNTER — OFFICE VISIT (OUTPATIENT)
Dept: CARDIOLOGY | Facility: CLINIC | Age: 62
End: 2022-05-03

## 2022-05-03 VITALS
SYSTOLIC BLOOD PRESSURE: 116 MMHG | BODY MASS INDEX: 31.5 KG/M2 | RESPIRATION RATE: 18 BRPM | WEIGHT: 196 LBS | OXYGEN SATURATION: 97 % | HEART RATE: 84 BPM | DIASTOLIC BLOOD PRESSURE: 64 MMHG | HEIGHT: 66 IN

## 2022-05-03 DIAGNOSIS — I38 VALVULAR HEART DISEASE: Chronic | ICD-10-CM

## 2022-05-03 DIAGNOSIS — E78.2 MIXED HYPERLIPIDEMIA: Chronic | ICD-10-CM

## 2022-05-03 DIAGNOSIS — R00.2 PALPITATIONS: ICD-10-CM

## 2022-05-03 DIAGNOSIS — I10 ESSENTIAL HYPERTENSION: Chronic | ICD-10-CM

## 2022-05-03 DIAGNOSIS — R07.89 CHEST PAIN, ATYPICAL: Primary | ICD-10-CM

## 2022-05-03 PROCEDURE — 99214 OFFICE O/P EST MOD 30 MIN: CPT | Performed by: INTERNAL MEDICINE

## 2022-05-03 PROCEDURE — 93000 ELECTROCARDIOGRAM COMPLETE: CPT | Performed by: INTERNAL MEDICINE

## 2022-05-03 RX ORDER — BRINZOLAMIDE 10 MG/ML
1 SUSPENSION/ DROPS OPHTHALMIC 2 TIMES DAILY
COMMUNITY
Start: 2022-02-16

## 2022-07-21 ENCOUNTER — TELEPHONE (OUTPATIENT)
Dept: SURGERY | Facility: CLINIC | Age: 62
End: 2022-07-21

## 2022-07-21 NOTE — TELEPHONE ENCOUNTER
Caller: Urszula Kee    Relationship to patient: Self    Best call back number: [phone]    Type of visit: NEW PROBLEM    Additional notes:PT CALLED TO RESCHEDULE HER APPT W/ DR NEWTON TODAY DUE TO A DEATH IN THE FAMILY. PT IS RESCHEDULED FOR 7/27.

## 2022-07-27 ENCOUNTER — OFFICE VISIT (OUTPATIENT)
Dept: SURGERY | Facility: CLINIC | Age: 62
End: 2022-07-27

## 2022-07-27 VITALS — BODY MASS INDEX: 31.82 KG/M2 | HEIGHT: 66 IN | WEIGHT: 198 LBS

## 2022-07-27 DIAGNOSIS — K64.9 HEMORRHOIDS, UNSPECIFIED HEMORRHOID TYPE: Primary | ICD-10-CM

## 2022-07-27 PROCEDURE — 99214 OFFICE O/P EST MOD 30 MIN: CPT | Performed by: SURGERY

## 2022-07-27 RX ORDER — HYDROCORTISONE 25 MG/G
CREAM TOPICAL
Qty: 30 G | Refills: 1 | Status: SHIPPED | OUTPATIENT
Start: 2022-07-27 | End: 2022-08-09

## 2022-07-27 NOTE — PROGRESS NOTES
Cc: Hemorrhoids    History of presenting illness:   This is a nice 61-year-old female who says that approximately a week ago she noticed a lump near the anus.  She has some mild pain associated with this with direct pressure.  She denies any constipation.  She denies any significant rectal bleeding.  Her last colonoscopy was in 2021.    Past Medical History: Breast cancer, hypertension, hyperlipidemia    Past Surgical History: Left breast lumpectomy, cataract extraction, pilonidal cystectomy (long ago), tubal ligation, colonoscopy most recently December 2021    Medications: Multivitamin, thyroid, progesterone, atenolol optic solution    Allergies: None known    Social History: Current long-term smoker of about 1/2 pack of cigarettes daily    Family History: Breast cancer in her mother, pancreatic cancer in her sister    Review of Systems:  Constitutional: Negative for fever, chills, change in weight  Neck: no swollen glands or dysphagia or odynophagia  Respiratory: negative for SOB, cough, hemoptysis or wheezing  Cardiovascular: negative for chest pain, palpitations or peripheral edema  Gastrointestinal: Denies constipation, rectal bleeding      Physical Exam:  BMI: 31.9  General: alert and oriented, appropriate, no acute distress  Eyes: No scleral icterus, extraocular movements are intact  Neck: Supple without lymphadenopathy or thyromegaly, trachea is in the midline  Respiratory: There is good bilateral chest expansion, no use of accessory muscles is noted  Cardiovascular: No jugular venous distention or peripheral edema is seen  Gastrointestinal: Soft and benign, no mass or hernia  Rectal: There is a mildly inflamed prolapsed hemorrhoid noted, no evidence of thrombosis, no anal fissure seen    Laboratory data: No recent relevant data    Imaging data: No recent relevant data      Assessment and plan:   -Mildly symptomatic hemorrhoid  -For the time being I have recommended conservative management.  I have given her  a prescription for Anusol ointment and recommended fiber supplementation, increased water intake and warm soaks  -She will use the Anusol for 2 weeks and then follow-up here  -I have told her that I think surgical intervention for this is more likely to cause trouble than benefit at this time, but we can reevaluate at her next visit.      Gera Yap MD, FACS  General, Minimally Invasive and Endoscopic Surgery  [facility]    [address], [address]  [place], [place], [address]  P: [phone]  F: [phone]

## 2022-08-17 ENCOUNTER — TELEPHONE (OUTPATIENT)
Dept: CARDIOLOGY | Facility: CLINIC | Age: 62
End: 2022-08-17

## 2022-08-17 DIAGNOSIS — I10 ESSENTIAL HYPERTENSION: ICD-10-CM

## 2022-08-17 RX ORDER — LISINOPRIL 10 MG/1
TABLET ORAL
Qty: 90 TABLET | Refills: 3 | Status: SHIPPED | OUTPATIENT
Start: 2022-08-17

## 2022-08-24 ENCOUNTER — OFFICE VISIT (OUTPATIENT)
Dept: SURGERY | Facility: CLINIC | Age: 62
End: 2022-08-24

## 2022-08-24 VITALS — BODY MASS INDEX: 31.82 KG/M2 | HEIGHT: 66 IN | WEIGHT: 198 LBS

## 2022-08-24 DIAGNOSIS — K64.9 HEMORRHOIDS, UNSPECIFIED HEMORRHOID TYPE: Primary | ICD-10-CM

## 2022-08-24 PROCEDURE — 99213 OFFICE O/P EST LOW 20 MIN: CPT | Performed by: SURGERY

## 2022-08-24 NOTE — PROGRESS NOTES
"Chief complaint: Follow-up hemorrhoid    Subjective: 62-year-old lady seen approximately 4 weeks after last visit.  She a small hemorrhoid that really was not causing much pain.  I gave her some Anusol and she really has not seen any improvement.  She had several weeks of loose stools, but over the last week this is improved and she feels her stools are fairly regular now.  There is only very rare minor spotting on the toilet paper.  She had a colonoscopy done last year.    Review of systems:  Constitutional: Denies fever, chills, change in weight  Respiratory: Denies cough or wheezing    Physical exam:  BMI 31.9  General: Awake and alert, no distress  Head: Normocephalic, atraumatic  Eyes: Extraocular movements intact, no icterus  Neck: Supple, trachea midline  Respiratory: No use of accessory muscles, good bilateral chest expansion  Gastrointestinal: Soft and benign, no mass  Rectal: Prolapsed hemorrhoids seen, noninflamed  Extremities: No peripheral edema, no deformity  Skin: Warm and dry    Assessment and plan:  -Prolapsed hemorrhoid, minimally symptomatic  -She does report some other loose stools and I think that this is bothering her more than anything.  She says that after she \"dabs her rectum after bowel movement\" she feels that she needs to have her bowels move again.  I have told her that I do not think this would be much improved with hemorrhoidectomy.  She is not very interested in hemorrhoidectomy.  I have advised that she discontinue the Anusol ointment and add a fiber supplement.  Warm soaks may also be beneficial.  Follow-up here in about 6 weeks to reevaluate progress.    Gera Yap MD  General and Endoscopic Surgery  [facility]    [address], [address]  [place], [place], [address]  P: [phone]  F: [phone]      "
right knee, occasional

## 2022-10-05 ENCOUNTER — TELEPHONE (OUTPATIENT)
Dept: SURGERY | Facility: CLINIC | Age: 62
End: 2022-10-05

## 2022-10-05 NOTE — TELEPHONE ENCOUNTER
Caller: Urszula Kee    Relationship to patient: Self    Best call back number: [phone]  Chief complaint: pt not feeling well today, would like to reschedule appt    Type of visit: follow up    Requested date: hub rescheduled for 10/10/22 1:30pm    If rescheduling, when is the original appointment: today    Additional notes:

## 2022-10-10 ENCOUNTER — OFFICE VISIT (OUTPATIENT)
Dept: SURGERY | Facility: CLINIC | Age: 62
End: 2022-10-10

## 2022-10-10 VITALS — HEIGHT: 66 IN | BODY MASS INDEX: 31.82 KG/M2 | WEIGHT: 198 LBS

## 2022-10-10 DIAGNOSIS — K62.9 FECAL INCONTINENCE DUE TO ANORECTAL DISORDER: Primary | ICD-10-CM

## 2022-10-10 DIAGNOSIS — R15.9 FECAL INCONTINENCE DUE TO ANORECTAL DISORDER: Primary | ICD-10-CM

## 2022-10-10 PROCEDURE — 99212 OFFICE O/P EST SF 10 MIN: CPT | Performed by: SURGERY

## 2022-10-10 RX ORDER — ESTRADIOL 1 MG/1
TABLET ORAL
COMMUNITY
Start: 2022-08-30

## 2022-10-10 RX ORDER — CELECOXIB 200 MG/1
200 CAPSULE ORAL DAILY
COMMUNITY
Start: 2022-10-06

## 2022-10-10 NOTE — PROGRESS NOTES
Follow-up hemorrhoid versus skin tag    Last seen approximately 6 weeks ago, doing about the same.  She does state that overall she is having less frequent loose stools but continues to complain of episodes of incontinence.  She says that after she wipes she feels that this stimulates her move her bowels more.  There is only mild pain.    Objective:  BMI 31.9  General: Awake and alert without distress  Rectal: Tone is slightly decreased but not dramatically so.  There is a hemorrhoid versus external skin tag, noninflamed and nontender.  No mass.    Assessment and plan:  -Probable irritated skin tag  -Would not recommend any surgical intervention for this  -Patient's main concern is really that of concerns about incontinence.  She states that she has read about potential interventions for this and wishes to see a colorectal surgeon.  I have placed a referral for the same.  I have also encouraged fiber supplementation.  The patient states that she is unable to take fiber supplements because of some of the other products within the fiber supplements.  I recommended that she try to get an increased amount of fiber through her diet such as vegetables beans, etc.  Patient expressed understanding.    Gera Yap MD  General and Endoscopic Surgery  [facility]    [address], [address]  [place], [place], [address]  P: [phone]  F: [phone]

## 2022-10-28 DIAGNOSIS — E78.2 MIXED HYPERLIPIDEMIA: ICD-10-CM

## 2022-11-02 RX ORDER — ATORVASTATIN CALCIUM 10 MG/1
TABLET, FILM COATED ORAL
Qty: 90 TABLET | Refills: 3 | Status: SHIPPED | OUTPATIENT
Start: 2022-11-02

## 2022-11-10 ENCOUNTER — OFFICE VISIT (OUTPATIENT)
Dept: CARDIOLOGY | Facility: CLINIC | Age: 62
End: 2022-11-10

## 2022-11-10 VITALS
BODY MASS INDEX: 31.34 KG/M2 | DIASTOLIC BLOOD PRESSURE: 72 MMHG | HEIGHT: 66 IN | WEIGHT: 195 LBS | HEART RATE: 73 BPM | SYSTOLIC BLOOD PRESSURE: 120 MMHG

## 2022-11-10 DIAGNOSIS — I10 ESSENTIAL HYPERTENSION: Chronic | ICD-10-CM

## 2022-11-10 DIAGNOSIS — R07.89 CHEST PAIN, ATYPICAL: ICD-10-CM

## 2022-11-10 DIAGNOSIS — R00.2 PALPITATIONS: ICD-10-CM

## 2022-11-10 DIAGNOSIS — I38 VALVULAR HEART DISEASE: Primary | Chronic | ICD-10-CM

## 2022-11-10 PROCEDURE — 99214 OFFICE O/P EST MOD 30 MIN: CPT | Performed by: INTERNAL MEDICINE

## 2022-11-10 NOTE — PROGRESS NOTES
NEW PATIENT, ESTABLISH CARE   Subjective:        Urszula Higgins is a 62 y.o. female who here for follow up    CC  HTN  HPI  62-year-old female here for the cardiac evaluation as well as establishment of the care with a known history of benign essential arterial hypertension also complains of (normal palpitations     Problems Addressed this Visit        Cardiac and Vasculature    Valvular heart disease - Primary (Chronic)    Relevant Orders    Adult Transthoracic Echo Complete W/ Cont if Necessary Per Protocol    Essential hypertension (Chronic)    Relevant Orders    Adult Transthoracic Echo Complete W/ Cont if Necessary Per Protocol    Palpitations    Relevant Orders    Adult Transthoracic Echo Complete W/ Cont if Necessary Per Protocol       Symptoms and Signs    Chest pain, atypical   Diagnoses       Codes Comments    Valvular heart disease    -  Primary ICD-10-CM: I38  ICD-9-CM: 424.90     Essential hypertension     ICD-10-CM: I10  ICD-9-CM: 401.9     Palpitations     ICD-10-CM: R00.2  ICD-9-CM: 785.1     Chest pain, atypical     ICD-10-CM: R07.89  ICD-9-CM: 786.59         .Interpretation Summary    • Calculated left ventricular EF = 58.4% Estimated left ventricular EF was in agreement with the calculated left ventricular EF. Left ventricular systolic function is normal.  • Left ventricular diastolic function was normal.  • The aortic valve is grossly normal in structure.  • Trace mitral valve regurgitation is present.  • Mild tricuspid valve regurgitation is present         The following portions of the patient's history were reviewed and updated as appropriate: allergies, current medications, past family history, past medical history, past social history, past surgical history and problem list.    Past Medical History:   Diagnosis Date   • Breast cancer (HCC)     Left breast DCIS   • Colon polyps    • Hyperlipidemia    • Hypertension    • Retinal detachment      reports that she has been smoking  Patient Seen in: [facility] Emergency Department    History   Patient presents with:  Upper Extremity Injury (musculoskeletal)    Stated Complaint: l hand swelling denies injury    HPI    Samreen Nunn is a 46-year-old who presents for evaluation of a le "cigarettes. She has a 12.50 pack-year smoking history. She has never used smokeless tobacco. She reports current alcohol use. She reports that she does not currently use drugs.   Family History   Problem Relation Age of Onset   • Breast cancer Mother    • Diabetes Mother    • Pancreatic cancer Sister    • Heart attack Paternal Grandmother    • Heart attack Paternal Grandfather        Review of Systems  Constitutional: No wt loss, fever, fatigue  Gastrointestinal: No nausea, abdominal pain  Behavioral/Psych: No insomnia or anxiety   Cardiovascular palpitation and chest pain  Objective:       Physical Exam  /72   Pulse 73   Ht 167.6 cm (66\")   Wt 88.5 kg (195 lb)   BMI 31.47 kg/m²   General appearance: No acute changes   Neck: Trachea midline; NECK, supple, no thyromegaly or lymphadenopathy   Lungs: Normal size and shape, normal breath sounds, equal distribution of air, no rales and rhonchi   CV: S1-S2 regular, no murmurs, no rub, no gallop   Abdomen: Soft, nontender; no masses , no abnormal abdominal sounds   Extremities: No deformity , normal color , no peripheral edema   Skin: Normal temperature, turgor and texture; no rash, ulcers          Procedures      Echocardiogram:        Current Outpatient Medications:   •  atorvastatin (LIPITOR) 10 MG tablet, TAKE 1 TABLET BY MOUTH EVERY DAY, Disp: 90 tablet, Rfl: 3  •  brinzolamide (AZOPT) 1 % ophthalmic suspension, Administer 1 drop into the left eye 2 (Two) Times a Day., Disp: , Rfl:   •  celecoxib (CeleBREX) 200 MG capsule, Take 1 capsule by mouth Daily., Disp: , Rfl:   •  estradiol (ESTRACE) 1 MG tablet, , Disp: , Rfl:   •  estradiol (ESTRACE) 2 MG tablet, Take 1 tablet by mouth Daily., Disp: , Rfl:   •  Fish Oil-Cholecalciferol (FISH OIL + D3) 2769-2022 MG-UNIT capsule, Take 2 g by mouth., Disp: , Rfl:   •  L-Arginine 1000 MG tablet, Take  by mouth., Disp: , Rfl:   •  Latanoprostene Bunod (Vyzulta) 0.024 % solution, Apply  to eye(s) as directed by provider " masses. EXTREMITIES: On examination of the left hand there is some slight swelling at the distal aspect of the left third metacarpal.  She also has a slight swelling at the medial aspect of the third finger over the distal phalanx.   She states that it i [id number] [phone]    Schedule an appointment as soon as possible for a visit  If symptoms worsen        Medications Prescribed:  There are no discharge medications for this patient. Daily., Disp: , Rfl:   •  lisinopril (PRINIVIL,ZESTRIL) 10 MG tablet, TAKE 1 TABLET BY MOUTH EVERY DAY, Disp: 90 tablet, Rfl: 3  •  Multiple Vitamins-Minerals (MULTIVITAMIN WITH MINERALS) tablet tablet, Take 1 tablet by mouth Daily., Disp: , Rfl:   •  Olopatadine HCl (Pazeo) 0.7 % solution, Apply  to eye(s) as directed by provider As Needed., Disp: , Rfl:   •  pilocarpine (PILOCAR) 1 % ophthalmic solution, As Needed., Disp: , Rfl:   •  Progesterone (PROMETRIUM) 200 MG capsule, Take 1 capsule by mouth Daily., Disp: , Rfl:   •  Thyroid 120 MG PO tablet, Take 1 tablet by mouth Daily., Disp: , Rfl:   •  timolol (TIMOPTIC) 0.5 % ophthalmic solution, INSTILL 1 DROP INTO BOTH EYES IN THE MORNING, Disp: , Rfl:    Assessment:        Patient Active Problem List   Diagnosis   • Family history of breast cancer in mother   • Mixed hyperlipidemia   • Chest pain, atypical   • Tobacco use   • Valvular heart disease   • Essential hypertension   • Personal history of colonic polyps   • Palpitations               Plan:            ICD-10-CM ICD-9-CM   1. Valvular heart disease  I38 424.90   2. Essential hypertension  I10 401.9   3. Palpitations  R00.2 785.1   4. Chest pain, atypical  R07.89 786.59     1. Valvular heart disease  Considering patient's medical condition as well as the risk factors, patient will require echocardiogram for further evaluation for the LV function, four-chamber evaluation, including the pressures, valvular function and  pericardial disease and pericardial effusion    - Adult Transthoracic Echo Complete W/ Cont if Necessary Per Protocol; Future    2. Essential hypertension  Continue current treatment  - Adult Transthoracic Echo Complete W/ Cont if Necessary Per Protocol; Future    3. Palpitations  Considering patient's medical condition as well as the risk factors, patient will require echocardiogram for further evaluation for the LV function, four-chamber evaluation, including the pressures, valvular function  and  pericardial disease and pericardial effusion    - Adult Transthoracic Echo Complete W/ Cont if Necessary Per Protocol; Future    4. Chest pain, atypical  Atypical       1 YR ECHO  COUNSELING:    Urszula Mills was given to patient for the following topics: diagnostic results, risk factor reductions, impressions, risks and benefits of treatment options and importance of treatment compliance .       SMOKING COUNSELING:        Dictated using Dragon dictation

## 2023-05-18 DIAGNOSIS — E78.2 MIXED HYPERLIPIDEMIA: ICD-10-CM

## 2023-05-19 RX ORDER — ATORVASTATIN CALCIUM 10 MG/1
10 TABLET, FILM COATED ORAL DAILY
Qty: 90 TABLET | Refills: 3 | Status: SHIPPED | OUTPATIENT
Start: 2023-05-19

## 2023-09-18 ENCOUNTER — TELEPHONE (OUTPATIENT)
Dept: CARDIOLOGY | Facility: CLINIC | Age: 63
End: 2023-09-18

## 2023-09-18 NOTE — TELEPHONE ENCOUNTER
Caller: Chand Bijaltray Urszula    Relationship: Self    Best call back number: [phone]    What medications are you currently taking:   Current Outpatient Medications on File Prior to Visit   Medication Sig Dispense Refill    atorvastatin (LIPITOR) 10 MG tablet Take 1 tablet by mouth Daily. 90 tablet 3    brinzolamide (AZOPT) 1 % ophthalmic suspension Administer 1 drop into the left eye 2 (Two) Times a Day.      celecoxib (CeleBREX) 200 MG capsule Take 1 capsule by mouth Daily.      estradiol (ESTRACE) 1 MG tablet       estradiol (ESTRACE) 2 MG tablet Take 1 tablet by mouth Daily.      Fish Oil-Cholecalciferol (FISH OIL + D3) 8431-5436 MG-UNIT capsule Take 2 g by mouth.      L-Arginine 1000 MG tablet Take  by mouth.      Latanoprostene Bunod (Vyzulta) 0.024 % solution Apply  to eye(s) as directed by provider Daily.      lisinopril (PRINIVIL,ZESTRIL) 10 MG tablet TAKE 1 TABLET BY MOUTH EVERY DAY 90 tablet 3    Multiple Vitamins-Minerals (MULTIVITAMIN WITH MINERALS) tablet tablet Take 1 tablet by mouth Daily.      Olopatadine HCl (Pazeo) 0.7 % solution Apply  to eye(s) as directed by provider As Needed.      pilocarpine (PILOCAR) 1 % ophthalmic solution As Needed.      Progesterone (PROMETRIUM) 200 MG capsule Take 1 capsule by mouth Daily.      Thyroid 120 MG PO tablet Take 1 tablet by mouth Daily.      timolol (TIMOPTIC) 0.5 % ophthalmic solution INSTILL 1 DROP INTO BOTH EYES IN THE MORNING       No current facility-administered medications on file prior to visit.          When did you start taking these medications: 3 YEARS    Which medication are you concerned about: LISINOPRIL    Who prescribed you this medication:WILMAR DANIELSON    What are your concerns: PATIENT IS ALMOST OUT OF MEDICATION AND WOULD LIKE TO KNOW IF DR. SMILEY CAN REFILL AND TAKE OVER THE MEDICATION

## 2023-09-19 DIAGNOSIS — I10 ESSENTIAL HYPERTENSION: ICD-10-CM

## 2023-09-19 RX ORDER — LISINOPRIL 10 MG/1
10 TABLET ORAL DAILY
Qty: 30 TABLET | Refills: 1 | Status: SHIPPED | OUTPATIENT
Start: 2023-09-19

## 2023-10-19 DIAGNOSIS — I10 ESSENTIAL HYPERTENSION: ICD-10-CM

## 2023-10-19 RX ORDER — LISINOPRIL 10 MG/1
10 TABLET ORAL DAILY
Qty: 90 TABLET | Refills: 0 | Status: SHIPPED | OUTPATIENT
Start: 2023-10-19

## 2023-11-07 ENCOUNTER — HOSPITAL ENCOUNTER (OUTPATIENT)
Dept: CARDIOLOGY | Facility: HOSPITAL | Age: 63
Discharge: HOME | End: 2023-11-07
Admitting: INTERNAL MEDICINE
Payer: COMMERCIAL

## 2023-11-07 VITALS
HEIGHT: 66 IN | SYSTOLIC BLOOD PRESSURE: 188 MMHG | DIASTOLIC BLOOD PRESSURE: 82 MMHG | WEIGHT: 194 LBS | BODY MASS INDEX: 31.18 KG/M2 | HEART RATE: 61 BPM

## 2023-11-07 DIAGNOSIS — I38 VALVULAR HEART DISEASE: Chronic | ICD-10-CM

## 2023-11-07 DIAGNOSIS — I10 ESSENTIAL HYPERTENSION: Chronic | ICD-10-CM

## 2023-11-07 DIAGNOSIS — R00.2 PALPITATIONS: ICD-10-CM

## 2023-11-07 DIAGNOSIS — I10 ESSENTIAL HYPERTENSION: ICD-10-CM

## 2023-11-07 PROCEDURE — 93306 TTE W/DOPPLER COMPLETE: CPT

## 2023-11-07 PROCEDURE — 25510000001 PERFLUTREN (DEFINITY) 8.476 MG IN SODIUM CHLORIDE (PF) 0.9 % 10 ML INJECTION: Performed by: INTERNAL MEDICINE

## 2023-11-07 PROCEDURE — 93306 TTE W/DOPPLER COMPLETE: CPT | Performed by: INTERNAL MEDICINE

## 2023-11-07 RX ORDER — LISINOPRIL 10 MG/1
10 TABLET ORAL DAILY
Qty: 90 TABLET | Refills: 1 | Status: SHIPPED | OUTPATIENT
Start: 2023-11-07

## 2023-11-07 RX ADMIN — SODIUM CHLORIDE 3 ML: 9 INJECTION INTRAMUSCULAR; INTRAVENOUS; SUBCUTANEOUS at 10:24

## 2023-11-08 LAB
AORTIC DIMENSIONLESS INDEX: 0.9 (DI)
ASCENDING AORTA: 2.9 CM
BH CV ECHO MEAS - ACS: 1.8 CM
BH CV ECHO MEAS - AO MAX PG: 5 MMHG
BH CV ECHO MEAS - AO MEAN PG: 2 MMHG
BH CV ECHO MEAS - AO ROOT DIAM: 3 CM
BH CV ECHO MEAS - AO V2 MAX: 112 CM/SEC
BH CV ECHO MEAS - AO V2 VTI: 24.9 CM
BH CV ECHO MEAS - AVA(I,D): 2.5 CM2
BH CV ECHO MEAS - EDV(CUBED): 85.2 ML
BH CV ECHO MEAS - EDV(MOD-SP2): 113 ML
BH CV ECHO MEAS - EDV(MOD-SP4): 90 ML
BH CV ECHO MEAS - EF(MOD-BP): 68.7 %
BH CV ECHO MEAS - EF(MOD-SP2): 69 %
BH CV ECHO MEAS - EF(MOD-SP4): 68.9 %
BH CV ECHO MEAS - ESV(CUBED): 32.8 ML
BH CV ECHO MEAS - ESV(MOD-SP2): 35 ML
BH CV ECHO MEAS - ESV(MOD-SP4): 28 ML
BH CV ECHO MEAS - FS: 27.3 %
BH CV ECHO MEAS - IVS/LVPW: 1 CM
BH CV ECHO MEAS - IVSD: 1.2 CM
BH CV ECHO MEAS - LAT PEAK E' VEL: 8.4 CM/SEC
BH CV ECHO MEAS - LV MASS(C)D: 191.3 GRAMS
BH CV ECHO MEAS - LV MAX PG: 2.8 MMHG
BH CV ECHO MEAS - LV MEAN PG: 1 MMHG
BH CV ECHO MEAS - LV V1 MAX: 83.5 CM/SEC
BH CV ECHO MEAS - LV V1 VTI: 19.8 CM
BH CV ECHO MEAS - LVIDD: 4.4 CM
BH CV ECHO MEAS - LVIDS: 3.2 CM
BH CV ECHO MEAS - LVOT AREA: 3.1 CM2
BH CV ECHO MEAS - LVOT DIAM: 2 CM
BH CV ECHO MEAS - LVPWD: 1.2 CM
BH CV ECHO MEAS - MED PEAK E' VEL: 7.8 CM/SEC
BH CV ECHO MEAS - MV A DUR: 0.12 SEC
BH CV ECHO MEAS - MV A MAX VEL: 73.7 CM/SEC
BH CV ECHO MEAS - MV DEC SLOPE: 570 CM/SEC2
BH CV ECHO MEAS - MV DEC TIME: 0.22 SEC
BH CV ECHO MEAS - MV E MAX VEL: 78.4 CM/SEC
BH CV ECHO MEAS - MV E/A: 1.06
BH CV ECHO MEAS - MV MAX PG: 3.3 MMHG
BH CV ECHO MEAS - MV MEAN PG: 1 MMHG
BH CV ECHO MEAS - MV P1/2T: 47.4 MSEC
BH CV ECHO MEAS - MV V2 VTI: 26.4 CM
BH CV ECHO MEAS - MVA(P1/2T): 4.6 CM2
BH CV ECHO MEAS - MVA(VTI): 2.36 CM2
BH CV ECHO MEAS - PA ACC TIME: 0.07 SEC
BH CV ECHO MEAS - PA V2 MAX: 85.5 CM/SEC
BH CV ECHO MEAS - PULM A REVS DUR: 0.13 SEC
BH CV ECHO MEAS - PULM A REVS VEL: 35.1 CM/SEC
BH CV ECHO MEAS - PULM DIAS VEL: 37.6 CM/SEC
BH CV ECHO MEAS - PULM S/D: 0.74
BH CV ECHO MEAS - PULM SYS VEL: 27.7 CM/SEC
BH CV ECHO MEAS - QP/QS: 1.04
BH CV ECHO MEAS - RAP SYSTOLE: 3 MMHG
BH CV ECHO MEAS - RV MAX PG: 2.01 MMHG
BH CV ECHO MEAS - RV V1 MAX: 70.9 CM/SEC
BH CV ECHO MEAS - RV V1 VTI: 18.7 CM
BH CV ECHO MEAS - RVOT DIAM: 2.1 CM
BH CV ECHO MEAS - RVSP: 29 MMHG
BH CV ECHO MEAS - SV(LVOT): 62.2 ML
BH CV ECHO MEAS - SV(MOD-SP2): 78 ML
BH CV ECHO MEAS - SV(MOD-SP4): 62 ML
BH CV ECHO MEAS - SV(RVOT): 64.8 ML
BH CV ECHO MEAS - TAPSE (>1.6): 1.5 CM
BH CV ECHO MEAS - TR MAX PG: 26.4 MMHG
BH CV ECHO MEAS - TR MAX VEL: 257 CM/SEC
BH CV ECHO MEASUREMENTS AVERAGE E/E' RATIO: 9.68
BH CV XLRA - RV BASE: 3 CM
BH CV XLRA - RV LENGTH: 6.9 CM
BH CV XLRA - RV MID: 3.2 CM
BH CV XLRA - TDI S': 11.1 CM/SEC
LEFT ATRIUM VOLUME INDEX: 26.8 ML/M2
SINUS: 3 CM
STJ: 2.36 CM

## 2023-11-13 ENCOUNTER — OFFICE VISIT (OUTPATIENT)
Dept: CARDIOLOGY | Facility: CLINIC | Age: 63
End: 2023-11-13
Payer: COMMERCIAL

## 2023-11-13 VITALS
HEART RATE: 69 BPM | WEIGHT: 187 LBS | SYSTOLIC BLOOD PRESSURE: 136 MMHG | HEIGHT: 66 IN | BODY MASS INDEX: 30.05 KG/M2 | DIASTOLIC BLOOD PRESSURE: 72 MMHG

## 2023-11-13 DIAGNOSIS — I38 VALVULAR HEART DISEASE: Primary | Chronic | ICD-10-CM

## 2023-11-13 DIAGNOSIS — E78.2 MIXED HYPERLIPIDEMIA: ICD-10-CM

## 2023-11-13 DIAGNOSIS — R00.2 PALPITATIONS: ICD-10-CM

## 2023-11-13 DIAGNOSIS — I10 ESSENTIAL HYPERTENSION: Chronic | ICD-10-CM

## 2023-11-13 PROCEDURE — 99214 OFFICE O/P EST MOD 30 MIN: CPT | Performed by: INTERNAL MEDICINE

## 2023-11-13 PROCEDURE — 93000 ELECTROCARDIOGRAM COMPLETE: CPT | Performed by: INTERNAL MEDICINE

## 2023-11-13 NOTE — PROGRESS NOTES
1 year f/u with echo    Subjective:        Urszula Higgins is a 63 y.o. female who here for follow up    CC  Follow-up hyperlipidemia valvular heart disease hypertension  HPI  63-year-old female with hyperlipidemia valvular heart disease hypertension palpitation here for the follow-up with no complaints of chest pain tightness heaviness or the pressure sensation     Problems Addressed this Visit          Cardiac and Vasculature    Mixed hyperlipidemia (Chronic)    Valvular heart disease - Primary (Chronic)    Relevant Orders    Lipid Panel    Essential hypertension (Chronic)    Relevant Orders    Lipid Panel    Palpitations    Relevant Orders    Lipid Panel     Diagnoses         Codes Comments    Valvular heart disease    -  Primary ICD-10-CM: I38  ICD-9-CM: 424.90     Essential hypertension     ICD-10-CM: I10  ICD-9-CM: 401.9     Palpitations     ICD-10-CM: R00.2  ICD-9-CM: 785.1     Mixed hyperlipidemia     ICD-10-CM: E78.2  ICD-9-CM: 272.2           .    The following portions of the patient's history were reviewed and updated as appropriate: allergies, current medications, past family history, past medical history, past social history, past surgical history and problem list.    Past Medical History:   Diagnosis Date    Breast cancer     Left breast DCIS    Colon polyps     Hyperlipidemia     Hypertension     Retinal detachment      reports that she has been smoking cigarettes. She has a 12.50 pack-year smoking history. She has never used smokeless tobacco. She reports current alcohol use. She reports that she does not currently use drugs.   Family History   Problem Relation Age of Onset    Breast cancer Mother     Diabetes Mother     Pancreatic cancer Sister     Heart attack Paternal Grandmother     Heart attack Paternal Grandfather        Review of Systems  Constitutional: No wt loss, fever, fatigue  Gastrointestinal: No nausea, abdominal pain  Behavioral/Psych: No insomnia or anxiety   Cardiovascular  "no chest pains or tightness in the chest  Objective:       Physical Exam           Physical Exam  /72   Pulse 69   Ht 167.6 cm (66\")   Wt 84.8 kg (187 lb)   BMI 30.18 kg/m²     General appearance: No acute changes   Eyes: Sclerae conjunctivae normal, pupils reactive   HENT: Atraumatic; oropharynx clear with moist mucous membranes and no mucosal ulcerations;  Neck: Trachea midline; NECK, supple, no thyromegaly or lymphadenopathy   Lungs: Normal size and shape, normal breath sounds, equal distribution of air, no rales and rhonchi   CV: S1-S2 regular, no murmurs, no rub, no gallop   Abdomen: Soft, nontender; no masses , no abnormal abdominal sounds   Extremities: No deformity , normal color , no peripheral edema   Skin: Normal temperature, turgor and texture; no rash, ulcers  Psych: Appropriate affect, alert and oriented to person, place and time             ECG 12 Lead    Date/Time: 11/13/2023 11:42 AM  Performed by: Alanna Mendoza MD    Authorized by: Alanna Mendoza MD  Comparison: compared with previous ECG   Similar to previous ECG  Rhythm: sinus rhythm  ST Flattening: all        nterpretation Summary         Left ventricular ejection fraction appears to be 66 - 70%.    Left ventricular diastolic function was normal.    Estimated right ventricular systolic pressure from tricuspid regurgitation is normal (<35 mmHg).    Echocardiogram:        Current Outpatient Medications:     Fish Oil-Cholecalciferol (FISH OIL + D3) 3666-4076 MG-UNIT capsule, Take 2 g by mouth., Disp: , Rfl:     L-Arginine 1000 MG tablet, Take  by mouth., Disp: , Rfl:     Latanoprostene Bunod (Vyzulta) 0.024 % solution, Apply  to eye(s) as directed by provider Daily., Disp: , Rfl:     lisinopril (PRINIVIL,ZESTRIL) 10 MG tablet, Take 1 tablet by mouth Daily., Disp: 90 tablet, Rfl: 1    Multiple Vitamins-Minerals (MULTIVITAMIN WITH MINERALS) tablet tablet, Take 1 tablet by mouth Daily., Disp: , Rfl:     timolol (TIMOPTIC) " 0.5 % ophthalmic solution, INSTILL 1 DROP INTO BOTH EYES IN THE MORNING, Disp: , Rfl:     brinzolamide (AZOPT) 1 % ophthalmic suspension, Administer 1 drop into the left eye 2 (Two) Times a Day., Disp: , Rfl:    Assessment:        [unfilled]            Plan:            ICD-10-CM ICD-9-CM   1. Valvular heart disease  I38 424.90   2. Essential hypertension  I10 401.9   3. Palpitations  R00.2 785.1   4. Mixed hyperlipidemia  E78.2 272.2     1. Valvular heart disease  Continue current treatment  - Lipid Panel    2. Essential hypertension  Continue current treatment  - Lipid Panel    3. Palpitations  Under control  - Lipid Panel    4. Mixed hyperlipidemia  Continue current treatment    Dc lipitor per pt [id number]    6 months with flp  COUNSELING:    Urszula Mills was given to patient for the following topics: diagnostic results, risk factor reductions, impressions, risks and benefits of treatment options and importance of treatment compliance .       SMOKING COUNSELING:        Dictated using Dragon dictation

## 2023-12-04 RX ORDER — ATORVASTATIN CALCIUM 10 MG/1
10 TABLET, FILM COATED ORAL DAILY
COMMUNITY
End: 2023-12-04 | Stop reason: SDUPTHER

## 2023-12-04 RX ORDER — ATORVASTATIN CALCIUM 10 MG/1
10 TABLET, FILM COATED ORAL DAILY
Qty: 30 TABLET | Refills: 5 | Status: SHIPPED | OUTPATIENT
Start: 2023-12-04

## 2024-03-25 RX ORDER — ATORVASTATIN CALCIUM 10 MG/1
10 TABLET, FILM COATED ORAL DAILY
Qty: 90 TABLET | Refills: 1 | Status: SHIPPED | OUTPATIENT
Start: 2024-03-25

## 2024-05-19 DIAGNOSIS — I10 ESSENTIAL HYPERTENSION: ICD-10-CM

## 2024-05-20 RX ORDER — LISINOPRIL 10 MG/1
10 TABLET ORAL DAILY
Qty: 90 TABLET | Refills: 1 | Status: SHIPPED | OUTPATIENT
Start: 2024-05-20

## 2024-06-04 ENCOUNTER — HOSPITAL ENCOUNTER (OUTPATIENT)
Dept: CARDIOLOGY | Facility: HOSPITAL | Age: 64
Discharge: HOME | End: 2024-06-04
Payer: COMMERCIAL

## 2024-06-04 LAB
CHOLEST SERPL-MCNC: 129 MG/DL (ref 0–200)
HDLC SERPL-MCNC: 39 MG/DL (ref 40–60)
LDLC SERPL CALC-MCNC: 69 MG/DL (ref 0–100)
LDLC/HDLC SERPL: 1.73 {RATIO}
TRIGL SERPL-MCNC: 113 MG/DL (ref 0–150)
VLDLC SERPL-MCNC: 21 MG/DL (ref 5–40)

## 2024-06-04 PROCEDURE — 80061 LIPID PANEL: CPT | Performed by: INTERNAL MEDICINE

## 2024-06-04 PROCEDURE — 36415 COLL VENOUS BLD VENIPUNCTURE: CPT | Performed by: INTERNAL MEDICINE

## 2024-06-06 ENCOUNTER — OFFICE VISIT (OUTPATIENT)
Dept: CARDIOLOGY | Facility: CLINIC | Age: 64
End: 2024-06-06
Payer: COMMERCIAL

## 2024-06-06 VITALS
BODY MASS INDEX: 29.57 KG/M2 | SYSTOLIC BLOOD PRESSURE: 133 MMHG | WEIGHT: 184 LBS | DIASTOLIC BLOOD PRESSURE: 79 MMHG | HEIGHT: 66 IN | HEART RATE: 73 BPM

## 2024-06-06 DIAGNOSIS — I10 ESSENTIAL HYPERTENSION: Chronic | ICD-10-CM

## 2024-06-06 DIAGNOSIS — I38 VALVULAR HEART DISEASE: Primary | Chronic | ICD-10-CM

## 2024-06-06 DIAGNOSIS — E78.2 MIXED HYPERLIPIDEMIA: Chronic | ICD-10-CM

## 2024-06-06 PROCEDURE — 99213 OFFICE O/P EST LOW 20 MIN: CPT | Performed by: INTERNAL MEDICINE

## 2024-06-06 RX ORDER — LEVOTHYROXINE, LIOTHYRONINE 76; 18 UG/1; UG/1
TABLET ORAL
COMMUNITY
Start: 2024-04-15

## 2024-06-06 RX ORDER — ESTRADIOL 2 MG/1
1 TABLET ORAL DAILY
COMMUNITY
Start: 2024-05-19

## 2024-06-06 NOTE — PROGRESS NOTES
6 MO FOLLOW UP WITH LAB RESULTS   Subjective:        Urszula Higgins is a 63 y.o. female who here for follow up    CC  Follow-up hyperlipidemia hypertension  HPI  63-year-old female with hyperlipidemia hypertension here for the follow-up with no chest pains or tightness in the chest     Problems Addressed this Visit          Cardiac and Vasculature    Mixed hyperlipidemia (Chronic)    Relevant Orders    Treadmill Stress Test    Lipid Panel    Comprehensive Metabolic Panel    Valvular heart disease - Primary (Chronic)    Relevant Orders    Treadmill Stress Test    Lipid Panel    Comprehensive Metabolic Panel    Essential hypertension (Chronic)    Relevant Orders    Treadmill Stress Test    Lipid Panel    Comprehensive Metabolic Panel     Diagnoses         Codes Comments    Valvular heart disease    -  Primary ICD-10-CM: I38  ICD-9-CM: 424.90     Mixed hyperlipidemia     ICD-10-CM: E78.2  ICD-9-CM: 272.2     Essential hypertension     ICD-10-CM: I10  ICD-9-CM: 401.9           .    The following portions of the patient's history were reviewed and updated as appropriate: allergies, current medications, past family history, past medical history, past social history, past surgical history and problem list.    Past Medical History:   Diagnosis Date    Breast cancer     Left breast DCIS    Colon polyps     Hyperlipidemia     Hypertension     Retinal detachment      reports that she has been smoking cigarettes. She has a 12.5 pack-year smoking history. She has never used smokeless tobacco. She reports current alcohol use. She reports that she does not currently use drugs.   Family History   Problem Relation Age of Onset    Breast cancer Mother     Diabetes Mother     Pancreatic cancer Sister     Heart attack Paternal Grandmother     Heart attack Paternal Grandfather        Review of Systems  Constitutional: No wt loss, fever, fatigue  Gastrointestinal: No nausea, abdominal pain  Behavioral/Psych: No insomnia or  "anxiety   Cardiovascular no chest pains or tightness in the chest  Objective:       Physical Exam  /79   Pulse 73   Ht 167.6 cm (66\")   Wt 83.5 kg (184 lb)   BMI 29.70 kg/m²   General appearance: No acute changes   Neck: Trachea midline; NECK, supple, no thyromegaly or lymphadenopathy   Lungs: Normal size and shape, normal breath sounds, equal distribution of air, no rales and rhonchi   CV: S1-S2 regular, no murmurs, no rub, no gallop   Abdomen: Soft, nontender; no masses , no abnormal abdominal sounds   Extremities: No deformity , normal color , no peripheral edema   Skin: Normal temperature, turgor and texture; no rash, ulcers          Procedures    ef Range & Units 2 d ago 4 yr ago   Total Cholesterol  0 - 200 mg/dL 129 223 High  R, CM   Triglycerides  0 - 150 mg/dL 113 146 R, CM   HDL Cholesterol  40 - 60 mg/dL 39 Low  37 Low  R   LDL Cholesterol  0 - 100 mg/dL 69 157 High  CM   VLDL Cholesterol  5 - 40 mg/dL 21 29 R   LDL/HDL Ratio 1.73      Echocardiogram:    Results for orders placed during the hospital encounter of 11/07/23    Adult Transthoracic Echo Complete W/ Cont if Necessary Per Protocol    Interpretation Summary    Left ventricular ejection fraction appears to be 66 - 70%.    Left ventricular diastolic function was normal.    Estimated right ventricular systolic pressure from tricuspid regurgitation is normal (<35 mmHg).          Current Outpatient Medications:     atorvastatin (LIPITOR) 10 MG tablet, TAKE 1 TABLET BY MOUTH DAILY. INDICATIONS: HIGH AMOUNT OF FATS IN THE BLOOD (Patient taking differently: Take 1 tablet by mouth Every Other Day. Indications: High Amount of Fats in the Blood), Disp: 90 tablet, Rfl: 1    brinzolamide (AZOPT) 1 % ophthalmic suspension, Administer 1 drop into the left eye 2 (Two) Times a Day., Disp: , Rfl:     estradiol (ESTRACE) 2 MG tablet, Take 1 tablet by mouth Daily., Disp: , Rfl:     Fish Oil-Cholecalciferol (FISH OIL + D3) 6698-1179 MG-UNIT capsule, Take 2 " g by mouth., Disp: , Rfl:     L-Arginine 1000 MG tablet, Take  by mouth., Disp: , Rfl:     lisinopril (PRINIVIL,ZESTRIL) 10 MG tablet, TAKE 1 TABLET BY MOUTH EVERY DAY, Disp: 90 tablet, Rfl: 1    Multiple Vitamins-Minerals (MULTIVITAMIN WITH MINERALS) tablet tablet, Take 1 tablet by mouth Daily., Disp: , Rfl:     NP Thyroid 120 MG tablet, , Disp: , Rfl:     timolol (TIMOPTIC) 0.5 % ophthalmic solution, INSTILL 1 DROP INTO BOTH EYES IN THE MORNING, Disp: , Rfl:    Assessment:                Plan:          ICD-10-CM ICD-9-CM   1. Valvular heart disease  I38 424.90   2. Mixed hyperlipidemia  E78.2 272.2   3. Essential hypertension  I10 401.9     1. Valvular heart disease    - Treadmill Stress Test  - Lipid Panel  - Comprehensive Metabolic Panel    2. Mixed hyperlipidemia    - Treadmill Stress Test  - Lipid Panel  - Comprehensive Metabolic Panel    3. Essential hypertension    - Treadmill Stress Test  - Lipid Panel  - Comprehensive Metabolic Panel      6 months ett, flp, cmp  COUNSELING:    Urszula Mills was given to patient for the following topics: diagnostic results, risk factor reductions, impressions, risks and benefits of treatment options and importance of treatment compliance .       SMOKING COUNSELING:        Dictated using Dragon dictation

## 2024-11-20 DIAGNOSIS — I10 ESSENTIAL HYPERTENSION: ICD-10-CM

## 2024-11-20 NOTE — TELEPHONE ENCOUNTER
Rx Refill Note  Requested Prescriptions     Pending Prescriptions Disp Refills    lisinopril (PRINIVIL,ZESTRIL) 10 MG tablet [Pharmacy Med Name: LISINOPRIL 10 MG TABLET] 90 tablet 1     Sig: TAKE 1 TABLET BY MOUTH EVERY DAY      Last office visit with prescribing clinician: 6/6/2024   Last telemedicine visit with prescribing clinician: Visit date not found   Next office visit with prescribing clinician: 12/12/2024                         Would you like a call back once the refill request has been completed: [] Yes [] No    If the office needs to give you a call back, can they leave a voicemail: [] Yes [] No    Rosy Elizondo, [facility]  11/20/24, 13:09 EST

## 2024-11-25 RX ORDER — LISINOPRIL 10 MG/1
10 TABLET ORAL DAILY
Qty: 90 TABLET | Refills: 1 | Status: SHIPPED | OUTPATIENT
Start: 2024-11-25

## 2024-12-11 ENCOUNTER — TELEPHONE (OUTPATIENT)
Dept: CARDIOLOGY | Facility: CLINIC | Age: 64
End: 2024-12-11
Payer: COMMERCIAL

## 2024-12-11 NOTE — TELEPHONE ENCOUNTER
Caller: Urszula Kee    Relationship: Self    Best call back number: [phone]     What orders are you requesting (i.e. lab or imaging): CBC LABS    In what timeframe would the patient need to come in: 12.13.24    Where will you receive your lab/imaging services: APPT ON 12.13.24    Additional notes: PT CALLING TO REQUEST AN ADDITIONAL CBC PANEL BE ORDERED SO SHE CAN COMPLETE IT ON THE 13TH WHEN SHE COMES INTO OFFICE FOR APPT - PT ASKING IF HER A1C CAN ALSO BE CHECKED AS WELL - SHE HAS HAD TROUBLE GETTING INTO HER PCP TO REQUEST THEM AND WOULD LIKE TO GO AHEAD AND GET THEM ALL AT ONCE -

## 2024-12-13 ENCOUNTER — HOSPITAL ENCOUNTER (OUTPATIENT)
Dept: CARDIOLOGY | Facility: HOSPITAL | Age: 64
Discharge: HOME | End: 2024-12-13
Payer: COMMERCIAL

## 2024-12-13 DIAGNOSIS — I10 ESSENTIAL HYPERTENSION: ICD-10-CM

## 2024-12-13 LAB
ALBUMIN SERPL-MCNC: 3.4 G/DL (ref 3.5–5.2)
ALBUMIN/GLOB SERPL: 0.9 G/DL
ALP SERPL-CCNC: 83 U/L (ref 39–117)
ALT SERPL W P-5'-P-CCNC: 8 U/L (ref 1–33)
ANION GAP SERPL CALCULATED.3IONS-SCNC: 10.8 MMOL/L (ref 5–15)
AST SERPL-CCNC: 10 U/L (ref 1–32)
BH CV STRESS BP STAGE 1: NORMAL
BH CV STRESS DURATION MIN STAGE 1: 3
BH CV STRESS DURATION SEC STAGE 1: 0
BH CV STRESS GRADE STAGE 1: 10
BH CV STRESS HR STAGE 1: 140
BH CV STRESS METS STAGE 1: 5
BH CV STRESS PROTOCOL 1: NORMAL
BH CV STRESS RECOVERY BP: NORMAL MMHG
BH CV STRESS RECOVERY HR: 94 BPM
BH CV STRESS SPEED STAGE 1: 1.7
BH CV STRESS STAGE 1: 1
BILIRUB SERPL-MCNC: <0.2 MG/DL (ref 0–1.2)
BUN SERPL-MCNC: 20 MG/DL (ref 8–23)
BUN/CREAT SERPL: 12.9 (ref 7–25)
CALCIUM SPEC-SCNC: 8.7 MG/DL (ref 8.6–10.5)
CHLORIDE SERPL-SCNC: 102 MMOL/L (ref 98–107)
CHOLEST SERPL-MCNC: 124 MG/DL (ref 0–200)
CO2 SERPL-SCNC: 19.2 MMOL/L (ref 22–29)
CREAT SERPL-MCNC: 1.55 MG/DL (ref 0.57–1)
EGFRCR SERPLBLD CKD-EPI 2021: 37.5 ML/MIN/1.73
GLOBULIN UR ELPH-MCNC: 3.7 GM/DL
GLUCOSE SERPL-MCNC: 109 MG/DL (ref 65–99)
HDLC SERPL-MCNC: 30 MG/DL (ref 40–60)
LDLC SERPL CALC-MCNC: 73 MG/DL (ref 0–100)
LDLC/HDLC SERPL: 2.37 {RATIO}
MAXIMAL PREDICTED HEART RATE: 157 BPM
PERCENT MAX PREDICTED HR: 89.17 %
POTASSIUM SERPL-SCNC: 4.6 MMOL/L (ref 3.5–5.2)
PROT SERPL-MCNC: 7.1 G/DL (ref 6–8.5)
SODIUM SERPL-SCNC: 132 MMOL/L (ref 136–145)
STRESS BASELINE BP: NORMAL MMHG
STRESS BASELINE HR: 80 BPM
STRESS PERCENT HR: 105 %
STRESS POST ESTIMATED WORKLOAD: 4.6 METS
STRESS POST EXERCISE DUR MIN: 3 MIN
STRESS POST EXERCISE DUR SEC: 0 SEC
STRESS POST PEAK BP: NORMAL MMHG
STRESS POST PEAK HR: 140 BPM
STRESS TARGET HR: 133 BPM
TRIGL SERPL-MCNC: 115 MG/DL (ref 0–150)
VLDLC SERPL-MCNC: 21 MG/DL (ref 5–40)

## 2024-12-13 PROCEDURE — 93017 CV STRESS TEST TRACING ONLY: CPT

## 2024-12-13 PROCEDURE — 80061 LIPID PANEL: CPT | Performed by: INTERNAL MEDICINE

## 2024-12-13 PROCEDURE — 36415 COLL VENOUS BLD VENIPUNCTURE: CPT | Performed by: INTERNAL MEDICINE

## 2024-12-13 PROCEDURE — 80053 COMPREHEN METABOLIC PANEL: CPT | Performed by: INTERNAL MEDICINE

## 2024-12-13 RX ORDER — LISINOPRIL 10 MG/1
10 TABLET ORAL DAILY
Qty: 90 TABLET | Refills: 1 | Status: SHIPPED | OUTPATIENT
Start: 2024-12-13

## 2024-12-13 NOTE — TELEPHONE ENCOUNTER
PT IS REQUESTING A REFILL FOR LISINOPRIL 10 MG BE SENT TO PHARMACY ON FILE 90 DAY SUPPLY PLEASE AND THANK YOU

## 2024-12-17 LAB
BH CV STRESS BP STAGE 1: NORMAL
BH CV STRESS DURATION MIN STAGE 1: 3
BH CV STRESS DURATION SEC STAGE 1: 0
BH CV STRESS GRADE STAGE 1: 10
BH CV STRESS HR STAGE 1: 140
BH CV STRESS METS STAGE 1: 5
BH CV STRESS PROTOCOL 1: NORMAL
BH CV STRESS RECOVERY BP: NORMAL MMHG
BH CV STRESS RECOVERY HR: 94 BPM
BH CV STRESS SPEED STAGE 1: 1.7
BH CV STRESS STAGE 1: 1
MAXIMAL PREDICTED HEART RATE: 157 BPM
PERCENT MAX PREDICTED HR: 89.17 %
STRESS BASELINE BP: NORMAL MMHG
STRESS BASELINE HR: 80 BPM
STRESS PERCENT HR: 105 %
STRESS POST ESTIMATED WORKLOAD: 4.6 METS
STRESS POST EXERCISE DUR MIN: 3 MIN
STRESS POST EXERCISE DUR SEC: 0 SEC
STRESS POST PEAK BP: NORMAL MMHG
STRESS POST PEAK HR: 140 BPM
STRESS TARGET HR: 133 BPM

## 2024-12-19 ENCOUNTER — TELEPHONE (OUTPATIENT)
Dept: CARDIOLOGY | Facility: CLINIC | Age: 64
End: 2024-12-19

## 2024-12-19 ENCOUNTER — OFFICE VISIT (OUTPATIENT)
Dept: CARDIOLOGY | Facility: CLINIC | Age: 64
End: 2024-12-19
Payer: COMMERCIAL

## 2024-12-19 VITALS
SYSTOLIC BLOOD PRESSURE: 125 MMHG | BODY MASS INDEX: 29.57 KG/M2 | HEIGHT: 66 IN | DIASTOLIC BLOOD PRESSURE: 72 MMHG | WEIGHT: 184 LBS

## 2024-12-19 DIAGNOSIS — I10 ESSENTIAL HYPERTENSION: ICD-10-CM

## 2024-12-19 DIAGNOSIS — E78.2 MIXED HYPERLIPIDEMIA: ICD-10-CM

## 2024-12-19 DIAGNOSIS — I38 VALVULAR HEART DISEASE: ICD-10-CM

## 2024-12-19 DIAGNOSIS — R00.2 PALPITATIONS: ICD-10-CM

## 2024-12-19 RX ORDER — METOPROLOL SUCCINATE 25 MG/1
25 TABLET, EXTENDED RELEASE ORAL DAILY
Qty: 30 TABLET | Refills: 11 | Status: SHIPPED | OUTPATIENT
Start: 2024-12-19

## 2024-12-19 RX ORDER — LATANOPROSTENE BUNOD 0.24 MG/ML
SOLUTION/ DROPS OPHTHALMIC
COMMUNITY
Start: 2024-11-18

## 2024-12-19 NOTE — PROGRESS NOTES
6MO W LAB AND TEST RESULT   Subjective:        Urszula Higgins is a 64 y.o. female who here for follow up    CC  Follow-up hyperlipidemia valvular heart disease and hypertension  HPI  64-year-old female here for the follow-up and for the test results     Problems Addressed this Visit          Cardiac and Vasculature    Mixed hyperlipidemia (Chronic)    Valvular heart disease (Chronic)    Relevant Medications    metoprolol succinate XL (TOPROL-XL) 25 MG 24 hr tablet    Essential hypertension (Chronic)    Relevant Medications    metoprolol succinate XL (TOPROL-XL) 25 MG 24 hr tablet    Palpitations     Other Visit Diagnoses       Abnormal labor    -  Primary    Relevant Orders    Ambulatory Referral to Nephrology          Diagnoses         Codes Comments    Abnormal labor    -  Primary ICD-10-CM: O62.9  ICD-9-CM: 661.90     Essential hypertension     ICD-10-CM: I10  ICD-9-CM: 401.9     Valvular heart disease     ICD-10-CM: I38  ICD-9-CM: 424.90     Palpitations     ICD-10-CM: R00.2  ICD-9-CM: 785.1     Mixed hyperlipidemia     ICD-10-CM: E78.2  ICD-9-CM: 272.2           .    The following portions of the patient's history were reviewed and updated as appropriate: allergies, current medications, past family history, past medical history, past social history, past surgical history and problem list.    Past Medical History:   Diagnosis Date    Breast cancer     Left breast DCIS    Colon polyps     Hyperlipidemia     Hypertension     Retinal detachment      reports that she has been smoking cigarettes. She has a 12.5 pack-year smoking history. She has never used smokeless tobacco. She reports current alcohol use. She reports that she does not currently use drugs.   Family History   Problem Relation Age of Onset    Breast cancer Mother     Diabetes Mother     Pancreatic cancer Sister     Heart attack Paternal Grandmother     Heart attack Paternal Grandfather        Review of Systems  Constitutional: No wt loss,  "fever, fatigue  Gastrointestinal: No nausea, abdominal pain  Behavioral/Psych: No insomnia or anxiety   Cardiovascular no chest pains or tightness in the chest  Objective:       Physical Exam  /72   Ht 167.6 cm (66\")   Wt 83.5 kg (184 lb)   BMI 29.70 kg/m²   General appearance: No acute changes   Neck: Trachea midline; NECK, supple, no thyromegaly or lymphadenopathy   Lungs: Normal size and shape, normal breath sounds, equal distribution of air, no rales and rhonchi   CV: S1-S2 regular, no murmurs, no rub, no gallop   Abdomen: Soft, nontender; no masses , no abnormal abdominal sounds   Extremities: No deformity , normal color , no peripheral edema   Skin: Normal temperature, turgor and texture; no rash, ulcers          Procedures      Echocardiogram:    Results for orders placed during the hospital encounter of 11/07/23    Adult Transthoracic Echo Complete W/ Cont if Necessary Per Protocol    Interpretation Summary    Left ventricular ejection fraction appears to be 66 - 70%.    Left ventricular diastolic function was normal.    Estimated right ventricular systolic pressure from tricuspid regurgitation is normal (<35 mmHg).          yumiko Cholesterol  0 - 200 mg/dL 124 129 223 High  R, CM   Triglycerides  0 - 150 mg/dL 115 113 146 R, CM   HDL Cholesterol  40 - 60 mg/dL 30 Low  39 Low  37 Low  R   LDL Cholesterol  0 - 100 mg/dL 73 69 157 High  CM   VLDL Cholesterol  5 - 40 mg/dL 21 21            Current Outpatient Medications:     atorvastatin (LIPITOR) 10 MG tablet, TAKE 1 TABLET BY MOUTH DAILY. INDICATIONS: HIGH AMOUNT OF FATS IN THE BLOOD (Patient taking differently: Take 1 tablet by mouth Every Other Day. Indications: High Amount of Fats in the Blood), Disp: 90 tablet, Rfl: 1    brinzolamide (AZOPT) 1 % ophthalmic suspension, Administer 1 drop into the left eye 2 (Two) Times a Day., Disp: , Rfl:     estradiol (ESTRACE) 2 MG tablet, Take 1 tablet by mouth Daily., Disp: , Rfl:     Fish Oil-Cholecalciferol " (FISH OIL + D3) 2287-6141 MG-UNIT capsule, Take 2 g by mouth., Disp: , Rfl:     Multiple Vitamins-Minerals (MULTIVITAMIN WITH MINERALS) tablet tablet, Take 1 tablet by mouth Daily., Disp: , Rfl:     NP Thyroid 120 MG tablet, , Disp: , Rfl:     timolol (TIMOPTIC) 0.5 % ophthalmic solution, INSTILL 1 DROP INTO BOTH EYES IN THE MORNING, Disp: , Rfl:     Vyzulta 0.024 % solution, instill 1 drop in both eyes every night at bedtime, Disp: , Rfl:     metoprolol succinate XL (TOPROL-XL) 25 MG 24 hr tablet, Take 1 tablet by mouth Daily., Disp: 30 tablet, Rfl: 11   Assessment:                Plan:          ICD-10-CM ICD-9-CM   1. Abnormal labor  O62.9 661.90   2. Essential hypertension  I10 401.9   3. Valvular heart disease  I38 424.90   4. Palpitations  R00.2 785.1   5. Mixed hyperlipidemia  E78.2 272.2     1. Abnormal labor    - Ambulatory Referral to Nephrology    2. Essential hypertension  Continue current treatment    3. Valvular heart disease  Stable    4. Palpitations  Continue current    5. Mixed hyperlipidemia  Continue current treatment    GFR LOW REFER TO RENAL    DC LISINOPRIL    START TOPROL 25    SEE 6 MONTHS WITH FLP, CMP  COUNSELING:    Urszula Mills was given to patient for the following topics: diagnostic results, risk factor reductions, impressions, risks and benefits of treatment options and importance of treatment compliance .       SMOKING COUNSELING:        Dictated using Dragon dictation

## 2024-12-19 NOTE — TELEPHONE ENCOUNTER
Caller: Urszula Kee    Relationship to patient: Self    Best call back number: [phone]    Patient is needing: WANTS TO DISCUSS STRESS TEST RESULTS. SAYS THEY DID NOT GO OVER IT DURING VISIT TODAY.

## 2024-12-20 ENCOUNTER — TELEPHONE (OUTPATIENT)
Dept: CARDIOLOGY | Facility: CLINIC | Age: 64
End: 2024-12-20

## 2024-12-20 NOTE — TELEPHONE ENCOUNTER
Caller: Urszula Kee    Relationship to patient: Self    Best call back number: [phone]    Patient is needing: PT CALLING TO SEE IF THE RESULTS OF HER STRESS TEST WERE BACK. SHE FORGOT TO ASK WHEN SHE SAW DR. SMILEY YESTERDAY AT HER APPT. ALSO WANTS TO KNOW IF SHE SHOULD TAKE HER metoprolol succinate XL (TOPROL-XL) 25 MG 24 hr tablet IN THE MORNING OR IN THE EVENING. PLEASE CALL TO ADVISE.

## 2025-01-13 RX ORDER — ATORVASTATIN CALCIUM 10 MG/1
10 TABLET, FILM COATED ORAL DAILY
Qty: 90 TABLET | Refills: 1 | Status: SHIPPED | OUTPATIENT
Start: 2025-01-13

## 2025-02-18 ENCOUNTER — TRANSCRIBE ORDERS (OUTPATIENT)
Dept: ADMINISTRATIVE | Facility: HOSPITAL | Age: 65
End: 2025-02-18
Payer: COMMERCIAL

## 2025-02-18 DIAGNOSIS — N18.31 STAGE 3A CHRONIC KIDNEY DISEASE: Primary | ICD-10-CM

## 2025-04-30 ENCOUNTER — HOSPITAL ENCOUNTER (OUTPATIENT)
Dept: ULTRASOUND IMAGING | Facility: HOSPITAL | Age: 65
Discharge: HOME | End: 2025-04-30
Admitting: INTERNAL MEDICINE
Payer: COMMERCIAL

## 2025-04-30 DIAGNOSIS — N18.31 STAGE 3A CHRONIC KIDNEY DISEASE: ICD-10-CM

## 2025-04-30 PROCEDURE — 76775 US EXAM ABDO BACK WALL LIM: CPT

## (undated) DEVICE — CANN NASL CO2 TRULINK W/O2 A/

## (undated) DEVICE — SINGLE-USE BIOPSY FORCEPS: Brand: RADIAL JAW 4

## (undated) DEVICE — FLEX ADVANTAGE 1500CC: Brand: FLEX ADVANTAGE

## (undated) DEVICE — TBG 02 CRUSH RESIST LF CLR 7FT

## (undated) DEVICE — Device

## (undated) DEVICE — TUBING, SUCTION, 1/4" X 10', STRAIGHT: Brand: MEDLINE

## (undated) DEVICE — KT ORCA ORCAPOD DISP STRL

## (undated) DEVICE — THE TORRENT IRRIGATION SCOPE CONNECTOR IS USED WITH THE TORRENT IRRIGATION TUBING TO PROVIDE IRRIGATION FLUIDS SUCH AS STERILE WATER DURING GASTROINTESTINAL ENDOSCOPIC PROCEDURES WHEN USED IN CONJUNCTION WITH AN IRRIGATION PUMP (OR ELECTROSURGICAL UNIT).: Brand: TORRENT

## (undated) DEVICE — GOWN ISOL W/THUMB UNIV BLU BX/15

## (undated) DEVICE — Device: Brand: DEFENDO AIR/WATER/SUCTION AND BIOPSY VALVE